# Patient Record
Sex: FEMALE | Race: WHITE | NOT HISPANIC OR LATINO | Employment: FULL TIME | URBAN - METROPOLITAN AREA
[De-identification: names, ages, dates, MRNs, and addresses within clinical notes are randomized per-mention and may not be internally consistent; named-entity substitution may affect disease eponyms.]

---

## 2023-03-21 PROBLEM — N89.8 VAGINAL ITCHING: Status: ACTIVE | Noted: 2023-03-21

## 2023-03-21 PROBLEM — G43.009 ATYPICAL MIGRAINE: Status: ACTIVE | Noted: 2023-03-21

## 2023-03-21 PROBLEM — R10.2 PELVIC PAIN IN FEMALE: Status: ACTIVE | Noted: 2023-03-21

## 2023-03-21 PROBLEM — R51.9 HEADACHE: Status: ACTIVE | Noted: 2023-03-21

## 2023-03-21 PROBLEM — B96.89 BACTERIAL VAGINOSIS: Status: ACTIVE | Noted: 2023-03-21

## 2023-03-21 PROBLEM — N20.0 NEPHROLITHIASIS: Status: ACTIVE | Noted: 2023-03-21

## 2023-03-21 PROBLEM — R07.89 ATYPICAL CHEST PAIN: Status: ACTIVE | Noted: 2023-03-21

## 2023-03-21 PROBLEM — O09.899 HIGH RISK PREGNANCY WITH LOW PAPPA (PREGNANCY-ASSOCIATED PLASMA PROTEIN A) (HHS-HCC): Status: ACTIVE | Noted: 2023-03-21

## 2023-03-21 PROBLEM — J32.9 SINUS INFECTION: Status: ACTIVE | Noted: 2023-03-21

## 2023-03-21 PROBLEM — B37.9 YEAST INFECTION: Status: ACTIVE | Noted: 2023-03-21

## 2023-03-21 PROBLEM — O28.0 HIGH RISK PREGNANCY WITH LOW PAPPA (PREGNANCY-ASSOCIATED PLASMA PROTEIN A) (HHS-HCC): Status: ACTIVE | Noted: 2023-03-21

## 2023-03-21 PROBLEM — M94.0 COSTOCHONDRITIS: Status: ACTIVE | Noted: 2023-03-21

## 2023-03-21 PROBLEM — F41.9 ANXIETY AND DEPRESSION: Status: ACTIVE | Noted: 2023-03-21

## 2023-03-21 PROBLEM — Z20.822 SUSPECTED COVID-19 VIRUS INFECTION: Status: ACTIVE | Noted: 2023-03-21

## 2023-03-21 PROBLEM — F32.A ANXIETY AND DEPRESSION: Status: ACTIVE | Noted: 2023-03-21

## 2023-03-21 PROBLEM — E83.52 HYPERCALCEMIA: Status: ACTIVE | Noted: 2023-03-21

## 2023-03-21 PROBLEM — M79.18 MYALGIA OF PELVIC FLOOR: Status: ACTIVE | Noted: 2023-03-21

## 2023-03-21 PROBLEM — L70.0 CYSTIC ACNE VULGARIS: Status: ACTIVE | Noted: 2023-03-21

## 2023-03-21 PROBLEM — R00.2 PALPITATIONS: Status: ACTIVE | Noted: 2023-03-21

## 2023-03-21 PROBLEM — K21.9 GERD (GASTROESOPHAGEAL REFLUX DISEASE): Status: ACTIVE | Noted: 2023-03-21

## 2023-03-21 PROBLEM — F31.9 BIPOLAR DISORDER (MULTI): Status: ACTIVE | Noted: 2023-03-21

## 2023-03-21 PROBLEM — R11.2 NAUSEA AND/OR VOMITING: Status: ACTIVE | Noted: 2023-03-21

## 2023-03-21 PROBLEM — A60.00 GENITAL HERPES: Status: ACTIVE | Noted: 2023-03-21

## 2023-03-21 PROBLEM — N76.0 BACTERIAL VAGINOSIS: Status: ACTIVE | Noted: 2023-03-21

## 2023-03-21 RX ORDER — CLINDAMYCIN AND BENZOYL PEROXIDE 10; 50 MG/G; MG/G
GEL TOPICAL
COMMUNITY
Start: 2022-05-16 | End: 2023-03-22

## 2023-03-21 RX ORDER — VALACYCLOVIR HYDROCHLORIDE 500 MG/1
TABLET, FILM COATED ORAL
COMMUNITY
Start: 2021-11-16 | End: 2023-03-22

## 2023-03-22 ENCOUNTER — OFFICE VISIT (OUTPATIENT)
Dept: PRIMARY CARE | Facility: CLINIC | Age: 28
End: 2023-03-22
Payer: COMMERCIAL

## 2023-03-22 ENCOUNTER — LAB (OUTPATIENT)
Dept: LAB | Facility: LAB | Age: 28
End: 2023-03-22
Payer: COMMERCIAL

## 2023-03-22 VITALS
OXYGEN SATURATION: 99 % | BODY MASS INDEX: 34.73 KG/M2 | RESPIRATION RATE: 16 BRPM | DIASTOLIC BLOOD PRESSURE: 90 MMHG | HEART RATE: 86 BPM | WEIGHT: 196 LBS | TEMPERATURE: 98.6 F | HEIGHT: 63 IN | SYSTOLIC BLOOD PRESSURE: 122 MMHG

## 2023-03-22 DIAGNOSIS — K21.9 GASTROESOPHAGEAL REFLUX DISEASE WITHOUT ESOPHAGITIS: ICD-10-CM

## 2023-03-22 DIAGNOSIS — R12 HEARTBURN: ICD-10-CM

## 2023-03-22 DIAGNOSIS — K21.9 GASTROESOPHAGEAL REFLUX DISEASE WITHOUT ESOPHAGITIS: Primary | ICD-10-CM

## 2023-03-22 DIAGNOSIS — R63.5 WEIGHT GAIN: ICD-10-CM

## 2023-03-22 LAB
ALANINE AMINOTRANSFERASE (SGPT) (U/L) IN SER/PLAS: 13 U/L (ref 7–45)
ALBUMIN (G/DL) IN SER/PLAS: 4.7 G/DL (ref 3.4–5)
ALKALINE PHOSPHATASE (U/L) IN SER/PLAS: 72 U/L (ref 33–110)
ANION GAP IN SER/PLAS: 12 MMOL/L (ref 10–20)
ASPARTATE AMINOTRANSFERASE (SGOT) (U/L) IN SER/PLAS: 13 U/L (ref 9–39)
BASOPHILS (10*3/UL) IN BLOOD BY AUTOMATED COUNT: 0.05 X10E9/L (ref 0–0.1)
BASOPHILS/100 LEUKOCYTES IN BLOOD BY AUTOMATED COUNT: 0.6 % (ref 0–2)
BILIRUBIN TOTAL (MG/DL) IN SER/PLAS: 0.5 MG/DL (ref 0–1.2)
CALCIUM (MG/DL) IN SER/PLAS: 9.6 MG/DL (ref 8.6–10.3)
CARBON DIOXIDE, TOTAL (MMOL/L) IN SER/PLAS: 26 MMOL/L (ref 21–32)
CHLORIDE (MMOL/L) IN SER/PLAS: 103 MMOL/L (ref 98–107)
CREATININE (MG/DL) IN SER/PLAS: 0.85 MG/DL (ref 0.5–1.05)
EOSINOPHILS (10*3/UL) IN BLOOD BY AUTOMATED COUNT: 0.13 X10E9/L (ref 0–0.7)
EOSINOPHILS/100 LEUKOCYTES IN BLOOD BY AUTOMATED COUNT: 1.4 % (ref 0–6)
ERYTHROCYTE DISTRIBUTION WIDTH (RATIO) BY AUTOMATED COUNT: 11.9 % (ref 11.5–14.5)
ERYTHROCYTE MEAN CORPUSCULAR HEMOGLOBIN CONCENTRATION (G/DL) BY AUTOMATED: 32.5 G/DL (ref 32–36)
ERYTHROCYTE MEAN CORPUSCULAR VOLUME (FL) BY AUTOMATED COUNT: 90 FL (ref 80–100)
ERYTHROCYTES (10*6/UL) IN BLOOD BY AUTOMATED COUNT: 4.71 X10E12/L (ref 4–5.2)
GFR FEMALE: >90 ML/MIN/1.73M2
GLUCOSE (MG/DL) IN SER/PLAS: 83 MG/DL (ref 74–99)
HEMATOCRIT (%) IN BLOOD BY AUTOMATED COUNT: 42.5 % (ref 36–46)
HEMOGLOBIN (G/DL) IN BLOOD: 13.8 G/DL (ref 12–16)
IMMATURE GRANULOCYTES/100 LEUKOCYTES IN BLOOD BY AUTOMATED COUNT: 0.2 % (ref 0–0.9)
LEUKOCYTES (10*3/UL) IN BLOOD BY AUTOMATED COUNT: 9 X10E9/L (ref 4.4–11.3)
LYMPHOCYTES (10*3/UL) IN BLOOD BY AUTOMATED COUNT: 2.46 X10E9/L (ref 1.2–4.8)
LYMPHOCYTES/100 LEUKOCYTES IN BLOOD BY AUTOMATED COUNT: 27.4 % (ref 13–44)
MONOCYTES (10*3/UL) IN BLOOD BY AUTOMATED COUNT: 0.58 X10E9/L (ref 0.1–1)
MONOCYTES/100 LEUKOCYTES IN BLOOD BY AUTOMATED COUNT: 6.5 % (ref 2–10)
NEUTROPHILS (10*3/UL) IN BLOOD BY AUTOMATED COUNT: 5.74 X10E9/L (ref 1.2–7.7)
NEUTROPHILS/100 LEUKOCYTES IN BLOOD BY AUTOMATED COUNT: 63.9 % (ref 40–80)
NRBC (PER 100 WBCS) BY AUTOMATED COUNT: 0 /100 WBC (ref 0–0)
PLATELETS (10*3/UL) IN BLOOD AUTOMATED COUNT: 286 X10E9/L (ref 150–450)
POTASSIUM (MMOL/L) IN SER/PLAS: 4.4 MMOL/L (ref 3.5–5.3)
PROTEIN TOTAL: 7.6 G/DL (ref 6.4–8.2)
SODIUM (MMOL/L) IN SER/PLAS: 137 MMOL/L (ref 136–145)
THYROTROPIN (MIU/L) IN SER/PLAS BY DETECTION LIMIT <= 0.05 MIU/L: 1.27 MIU/L (ref 0.44–3.98)
UREA NITROGEN (MG/DL) IN SER/PLAS: 15 MG/DL (ref 6–23)

## 2023-03-22 PROCEDURE — 99214 OFFICE O/P EST MOD 30 MIN: CPT | Performed by: FAMILY MEDICINE

## 2023-03-22 PROCEDURE — 80053 COMPREHEN METABOLIC PANEL: CPT

## 2023-03-22 PROCEDURE — 85025 COMPLETE CBC W/AUTO DIFF WBC: CPT

## 2023-03-22 PROCEDURE — 84443 ASSAY THYROID STIM HORMONE: CPT

## 2023-03-22 PROCEDURE — 36415 COLL VENOUS BLD VENIPUNCTURE: CPT

## 2023-03-22 RX ORDER — PANTOPRAZOLE SODIUM 40 MG/1
40 TABLET, DELAYED RELEASE ORAL DAILY
Qty: 30 TABLET | Refills: 2 | Status: SHIPPED | OUTPATIENT
Start: 2023-03-22 | End: 2024-01-29 | Stop reason: SDUPTHER

## 2023-03-22 ASSESSMENT — ENCOUNTER SYMPTOMS
NAUSEA: 1
HEARTBURN: 1

## 2023-03-22 NOTE — PATIENT INSTRUCTIONS
Today we addressed your heartburn and reflux symptoms  Start PPI and check labs  Encourage better nutrition and exercise habits      Follow up in 3 months to recheck.   If no better we will consult GI.

## 2023-03-22 NOTE — PROGRESS NOTES
"Subjective   Patient ID: Lynette Landers is a 28 y.o. female who presents for Nausea and Heartburn.    Stomach bug at home 2 months ago and then thought she was pregnant and had negative pregnancy tests.  She has been experiencing nausea mostly in the evening.  She doesn't throw up. She has tried tums.  The heartburn is happening daily.  Food choice doesn't matter it happens with everything.  When she was pregnant she took something for heartburn.    She denies any chanes in bowels, no diarrhea/constipation or black tarry stool and no blood in the stool.  In December she took a pay cut and has been eating more processed foods and less fresh fruits and veggies.      Heartburn  She complains of heartburn and nausea. This is a chronic problem. The current episode started more than 1 month ago. The problem occurs constantly. The heartburn duration is several minutes. The heartburn is located in the substernum. The heartburn is of moderate intensity. The symptoms are aggravated by certain foods and stress. Risk factors include obesity, lack of exercise, NSAIDs and caffeine use. She has tried an antacid for the symptoms. The treatment provided mild relief.        Review of Systems   Gastrointestinal:  Positive for heartburn and nausea.       Objective   /90   Pulse 86   Temp 37 °C (98.6 °F)   Resp 16   Ht 1.6 m (5' 3\")   Wt 88.9 kg (196 lb)   LMP 02/25/2023   SpO2 99%   BMI 34.72 kg/m²     Physical Exam  Constitutional:       Appearance: Normal appearance.   HENT:      Head: Normocephalic and atraumatic.      Mouth/Throat:      Mouth: Mucous membranes are moist.   Eyes:      Pupils: Pupils are equal, round, and reactive to light.   Cardiovascular:      Rate and Rhythm: Normal rate and regular rhythm.   Pulmonary:      Effort: Pulmonary effort is normal.      Breath sounds: Normal breath sounds.   Abdominal:      General: Abdomen is flat. Bowel sounds are normal.      Tenderness: There is abdominal tenderness " (epigastric to RUQ region). There is no guarding.   Musculoskeletal:      Cervical back: Normal range of motion and neck supple.   Neurological:      Mental Status: She is alert.   Psychiatric:         Mood and Affect: Mood normal.         Assessment/Plan   Diagnoses and all orders for this visit:  Gastroesophageal reflux disease without esophagitis  -     pantoprazole (ProtoNix) 40 mg EC tablet; Take 1 tablet (40 mg) by mouth once daily. Do not crush, chew, or split.  -     CBC and Auto Differential; Future  -     Comprehensive Metabolic Panel; Future  -     TSH with reflex to Free T4 if abnormal; Future  Heartburn  -     pantoprazole (ProtoNix) 40 mg EC tablet; Take 1 tablet (40 mg) by mouth once daily. Do not crush, chew, or split.  Weight gain

## 2023-05-01 ENCOUNTER — PATIENT MESSAGE (OUTPATIENT)
Dept: PRIMARY CARE | Facility: CLINIC | Age: 28
End: 2023-05-01
Payer: COMMERCIAL

## 2023-05-01 DIAGNOSIS — K21.9 GASTROESOPHAGEAL REFLUX DISEASE WITHOUT ESOPHAGITIS: Primary | ICD-10-CM

## 2023-06-26 ENCOUNTER — APPOINTMENT (OUTPATIENT)
Dept: PRIMARY CARE | Facility: CLINIC | Age: 28
End: 2023-06-26
Payer: COMMERCIAL

## 2023-08-02 LAB — URINE CULTURE: ABNORMAL

## 2024-01-16 ENCOUNTER — TELEMEDICINE (OUTPATIENT)
Dept: DERMATOLOGY | Facility: CLINIC | Age: 29
End: 2024-01-16
Payer: COMMERCIAL

## 2024-01-16 DIAGNOSIS — L70.0 ACNE VULGARIS: Primary | ICD-10-CM

## 2024-01-16 PROCEDURE — 99214 OFFICE O/P EST MOD 30 MIN: CPT | Performed by: NURSE PRACTITIONER

## 2024-01-16 RX ORDER — TRETINOIN 0.5 MG/G
CREAM TOPICAL NIGHTLY
Qty: 20 G | Refills: 1 | Status: SHIPPED | OUTPATIENT
Start: 2024-01-16 | End: 2025-01-15

## 2024-01-16 RX ORDER — BENZOYL PEROXIDE 100 MG/ML
1 LIQUID TOPICAL DAILY
Qty: 237 G | Refills: 11 | Status: SHIPPED | OUTPATIENT
Start: 2024-01-16 | End: 2025-01-15

## 2024-01-16 RX ORDER — CLINDAMYCIN PHOSPHATE 10 UG/ML
LOTION TOPICAL 2 TIMES DAILY
Qty: 60 ML | Refills: 1 | Status: SHIPPED | OUTPATIENT
Start: 2024-01-16

## 2024-01-16 NOTE — PROGRESS NOTES
Subjective     Jennifer Landers is a 28 y.o. female who presents for the following: Acne.     Established patient in for follow up for acne to face.   Has tried BPO wash. Clindamycin lotion. Clindamycin/BPO gel, sprionolactone 50mg, tretinion 0.025%    Review of Systems:  No other skin or systemic complaints other than what is documented elsewhere in the note.    The following portions of the chart were reviewed this encounter and updated as appropriate:       Skin Cancer History  No skin cancer on file.    Specialty Problems          Dermatology Problems    Cystic acne vulgaris     Past Medical History:  Jennifer Landers  has a past medical history of Acute vaginitis (06/08/2020), Encounter for supervision of normal pregnancy, unspecified, unspecified trimester (08/09/2019), Encounter for supervision of normal pregnancy, unspecified, unspecified trimester (10/22/2019), Other complications of the puerperium, not elsewhere classified (06/16/2020), Other conditions influencing health status (07/23/2019), Other viral diseases complicating pregnancy, unspecified trimester (08/09/2019), Personal history of diseases of the blood and blood-forming organs and certain disorders involving the immune mechanism (11/11/2019), Personal history of diseases of the skin and subcutaneous tissue (08/09/2019), Personal history of other diseases of the female genital tract (07/23/2019), Personal history of other infectious and parasitic diseases (05/15/2020), Personal history of other specified conditions (05/04/2020), Personal history of other specified conditions (11/18/2019), Personal history of other specified conditions (12/03/2019), Sacrococcygeal disorders, not elsewhere classified (11/12/2020), Secondary amenorrhea (11/11/2019), Unspecified infection of urinary tract in pregnancy, unspecified trimester (10/05/2020), and Unspecified tubal pregnancy without intrauterine pregnancy (10/05/2020).    Past Surgical History:  Jennifer Landers   has a past surgical history that includes Other surgical history (02/09/2021).    Family History:  Patient family history includes Chiari malformation in her mother; Hypothyroidism in her mother.    Social History:  Jennifer Landers  reports that she has never smoked. She has never used smokeless tobacco. No history on file for alcohol use and drug use.    Allergies:  Aripiprazole    Current Medications / CAM's:    Current Outpatient Medications:     benzoyl peroxide (Benzac AC) 10 % external wash, Apply 1 Application topically once daily., Disp: 237 g, Rfl: 11    clindamycin (Cleocin T) 1 % lotion, Apply topically 2 times a day., Disp: 60 mL, Rfl: 1    pantoprazole (ProtoNix) 40 mg EC tablet, Take 1 tablet (40 mg) by mouth once daily. Do not crush, chew, or split., Disp: 30 tablet, Rfl: 2    tretinoin (Retin-A) 0.05 % cream, Apply topically once daily at bedtime. Apply a pea size amount nightly to entire face. Moisturize as needed, Disp: 20 g, Rfl: 1     Objective   Well appearing patient in no apparent distress; mood and affect are within normal limits.    A full examination was performed including scalp, head, eyes, ears, nose, lips, neck, chest, axillae, abdomen, back, buttocks, bilateral upper extremities, bilateral lower extremities, hands, feet, fingers, toes, fingernails, and toenails. All findings within normal limits unless otherwise noted below.    Assessment/Plan   1. Acne vulgaris  Head - Anterior (Face)  Scattered comedones and inflammatory papulopustules.  Worse at menstruation.     Maintenance of Current Regimen: Please continue using the prescribed topical medications as directed. Consistency is key to maintaining the improvement achieved so far.    PLAN:  Start tretinoin 0.05% nightly  Start clindamycin 1% lotion daily  Start BPO 10% wash daily  Patient is still breastfeeding, so can consider oral treatments in the future      Further Treatment Options: If there are no significant improvements or if  your acne worsens, we may consider additional treatment options such as oral medications or in-office procedures. However, it is important to note that most cases of mild acne can be managed effectively with the current regimen.    Skin Care Tips: Continue following a gentle cleansing routine, avoiding harsh scrubbing, and using non-comedogenic moisturizers to keep your skin hydrated without clogging pores.    Cleansing Routine: Gentle Cleanser: You have been using a mild, non-comedogenic cleanser to wash your face twice daily. This practice helps to remove excess oil, dirt, and impurities from your skin, minimizing the risk of pore blockage.    Lifestyle Measures: Avoiding Trigger Factors: We also discussed avoiding known triggers such as excessive sun exposure, touching or picking at the acne lesions, and using heavy or comedogenic cosmetic products.    Please feel free to contact our clinic if you have any questions or concerns between appointments. Remember, acne treatment requires patience and consistency.                 tretinoin (Retin-A) 0.05 % cream - Head - Anterior (Face)  Apply topically once daily at bedtime. Apply a pea size amount nightly to entire face. Moisturize as needed    clindamycin (Cleocin T) 1 % lotion - Head - Anterior (Face)  Apply topically 2 times a day.    benzoyl peroxide (Benzac AC) 10 % external wash - Head - Anterior (Face)  Apply 1 Application topically once daily.

## 2024-01-22 ENCOUNTER — DOCUMENTATION (OUTPATIENT)
Dept: DERMATOLOGY | Facility: CLINIC | Age: 29
End: 2024-01-22
Payer: COMMERCIAL

## 2024-01-22 NOTE — PROGRESS NOTES
PA for Tretinoin 0.05% cream has been approved. Approval to be scanned in chart. Pharmacy and pt to be notified.

## 2024-01-29 ENCOUNTER — OFFICE VISIT (OUTPATIENT)
Dept: PRIMARY CARE | Facility: CLINIC | Age: 29
End: 2024-01-29
Payer: COMMERCIAL

## 2024-01-29 VITALS
TEMPERATURE: 98.3 F | SYSTOLIC BLOOD PRESSURE: 114 MMHG | DIASTOLIC BLOOD PRESSURE: 82 MMHG | OXYGEN SATURATION: 99 % | HEART RATE: 93 BPM | WEIGHT: 194 LBS | RESPIRATION RATE: 16 BRPM | BODY MASS INDEX: 34.37 KG/M2

## 2024-01-29 DIAGNOSIS — K21.9 GASTROESOPHAGEAL REFLUX DISEASE WITHOUT ESOPHAGITIS: ICD-10-CM

## 2024-01-29 DIAGNOSIS — R12 HEARTBURN: ICD-10-CM

## 2024-01-29 PROCEDURE — 99214 OFFICE O/P EST MOD 30 MIN: CPT | Performed by: FAMILY MEDICINE

## 2024-01-29 RX ORDER — OMEPRAZOLE 10 MG/1
10 CAPSULE, DELAYED RELEASE ORAL
COMMUNITY
End: 2024-01-29 | Stop reason: WASHOUT

## 2024-01-29 RX ORDER — PANTOPRAZOLE SODIUM 40 MG/1
40 TABLET, DELAYED RELEASE ORAL DAILY
Qty: 30 TABLET | Refills: 2 | Status: SHIPPED | OUTPATIENT
Start: 2024-01-29 | End: 2024-04-29

## 2024-01-29 ASSESSMENT — ENCOUNTER SYMPTOMS: ABDOMINAL PAIN: 1

## 2024-01-29 NOTE — PATIENT INSTRUCTIONS
Today we addressed your recurrent abdomianl pain/GERD symptoms  You have had slight improvement on otc omeprazole  I will switch you back to Rx pantoprazole and I will have you follow up with GI since this is your second episode this year.   If anything worsens please let me know ASAP    Please make an appointment to follow up for your Annual Physical.

## 2024-01-29 NOTE — PROGRESS NOTES
Subjective   Patient ID: Jennifer Landers is a 29 y.o. female who presents for Abdominal Pain (Upper left quadrant - nausea. No vomiting, diarrhea or constipation).    She woke up with a tearing pain in her stomach and ever since then it's been hurting. It's in the upper left side.It comes and goes.  Eating makes it worse.  Th pain sometimes travels through to the back.  No blood in the stool or black stool.  She is nauseous.  No vomiting.  She denies any dietary changes and hasn't eaten anything different.  She is taking otc omeprazole but it hasn't helped completely.  Her BM's are normal for the most part.  She was treated in March and did well on pantoprazole for 3 months.  She then stopped it.  Now the symptoms are back.      Abdominal Pain         Review of Systems   Gastrointestinal:  Positive for abdominal pain.       Objective   /82   Pulse 93   Temp 36.8 °C (98.3 °F)   Resp 16   Wt 88 kg (194 lb)   LMP 01/22/2024   SpO2 99%   BMI 34.37 kg/m²     Physical Exam  Constitutional:       Appearance: Normal appearance.   HENT:      Head: Normocephalic and atraumatic.      Mouth/Throat:      Mouth: Mucous membranes are moist.   Eyes:      Pupils: Pupils are equal, round, and reactive to light.   Cardiovascular:      Rate and Rhythm: Normal rate and regular rhythm.      Pulses: Normal pulses.   Pulmonary:      Effort: Pulmonary effort is normal.      Breath sounds: Normal breath sounds.   Abdominal:      General: Abdomen is flat. Bowel sounds are normal. There is no distension.      Palpations: There is no mass.      Tenderness: There is no abdominal tenderness. There is no guarding.   Musculoskeletal:      Cervical back: Normal range of motion and neck supple.   Neurological:      Mental Status: She is alert.   Psychiatric:         Mood and Affect: Mood normal.         Assessment/Plan   Diagnoses and all orders for this visit:  Gastroesophageal reflux disease without esophagitis  -     pantoprazole  (ProtoNix) 40 mg EC tablet; Take 1 tablet (40 mg) by mouth once daily. Do not crush, chew, or split.  -     Referral to Gastroenterology; Future  Heartburn  -     pantoprazole (ProtoNix) 40 mg EC tablet; Take 1 tablet (40 mg) by mouth once daily. Do not crush, chew, or split.  -     Referral to Gastroenterology; Future

## 2024-02-19 ENCOUNTER — OFFICE VISIT (OUTPATIENT)
Dept: GASTROENTEROLOGY | Facility: CLINIC | Age: 29
End: 2024-02-19
Payer: COMMERCIAL

## 2024-02-19 VITALS
HEART RATE: 90 BPM | HEIGHT: 63 IN | BODY MASS INDEX: 34.48 KG/M2 | DIASTOLIC BLOOD PRESSURE: 84 MMHG | SYSTOLIC BLOOD PRESSURE: 121 MMHG | OXYGEN SATURATION: 98 % | WEIGHT: 194.6 LBS

## 2024-02-19 DIAGNOSIS — R12 HEARTBURN: ICD-10-CM

## 2024-02-19 DIAGNOSIS — R10.13 EPIGASTRIC ABDOMINAL PAIN: Primary | ICD-10-CM

## 2024-02-19 DIAGNOSIS — K21.9 GASTROESOPHAGEAL REFLUX DISEASE WITHOUT ESOPHAGITIS: ICD-10-CM

## 2024-02-19 PROCEDURE — 1036F TOBACCO NON-USER: CPT | Performed by: NURSE PRACTITIONER

## 2024-02-19 PROCEDURE — 99213 OFFICE O/P EST LOW 20 MIN: CPT | Performed by: NURSE PRACTITIONER

## 2024-02-19 NOTE — PROGRESS NOTES
"Assessment/Plan   Jennifer Landers is a 29 y.o. female who presents to GI clinic for LUQ abdominal pain with nausea worse with food. Reports pain in LUQ. Pain \"comes and goes\"and eating makes it worse. Pain radiates to the back at times. She denies weight loss, diarrhea, black or bloody stools. Pt was taking OTC omeprazole with no relief of symptoms. PCP started her on Pantoprazole 40 mg PO once daily and patient reports improvement in symptoms. Patient does report having heart burn that started during her pregnancy 2.5 years ago. She took famotidine during pregnancy and then was able to wean off of medication after the baby was born. She reports having heartburn now when eating red sauces. No routine NSAID use. No family history of GI malignancies.     GERD vs gastritis  2.   Consider gallstones, cholecystitis    Continue Pantoprazole 40 mg PO once daily  Antireflux lifestyle  Avoid NSAIDs  Check RUQ US - results will be called to patient once available.  Follow up with GI in 4 weeks.      Antireflux lifestyle modifications   Avoid alcoholic beverages, Caffeine, carbonated beverages, smoking, trigger foods. Trigger foods include citrus fruits, chocolate, fatty and fried foods, garlic and onions, mint flavorings, spicy foods and tomato-based foods like spaghetti sauce, salsa, chili or pizza.  Avoid aspirin and NSAIDs if possible  Eat small frequent meals. Eat at least 2 hours prior to going to bed.   Try raising the head of the bed to prevent stomach acid from coming back up.   Recommend stress reduction, weight reduction, and physical exercise can help reduce symptoms of GERD.   Call or return to office if GERD symptoms become worse or if you have difficulty swallowing, pain with swallowing, are vomiting blood, notice black or bloody stools, or have unexplained weight loss.    Yanely Mills, APRN-CNP      Subjective     History of Present Illness:   Jennifer Landers is a 29 y.o. female who presents to GI clinic for " "GERD. Patient was seen by her PCP for tearing abdominal pain with nausea, no vomiting. Reports pain in LUQ. Pain \"comes and goes\"and eating makes it worse. Pain radiates to the back at times. She denies weight loss, diarrhea, black or bloody stools. Pt was taking OTC omeprazole with no relief of symptoms. PCP started her on Pantoprazole 40 mg PO once daily and patient reports improvement in symptoms. Patient does report having heart burn that started during her pregnancy 2.5 years ago. She took famotidine during pregnancy and then was able to wean off of medication after the baby was born. She reports having heartburn now when eating red sauces. No routine NSAID use. No family history of GI malignancies.     Review of Systems  ROS Negative unless otherwise stated above.    Past Medical History   has a past medical history of Acute vaginitis (06/08/2020), Encounter for supervision of normal pregnancy, unspecified, unspecified trimester (08/09/2019), Encounter for supervision of normal pregnancy, unspecified, unspecified trimester (10/22/2019), Other complications of the puerperium, not elsewhere classified (06/16/2020), Other conditions influencing health status (07/23/2019), Other viral diseases complicating pregnancy, unspecified trimester (08/09/2019), Personal history of diseases of the blood and blood-forming organs and certain disorders involving the immune mechanism (11/11/2019), Personal history of diseases of the skin and subcutaneous tissue (08/09/2019), Personal history of other diseases of the female genital tract (07/23/2019), Personal history of other infectious and parasitic diseases (05/15/2020), Personal history of other specified conditions (05/04/2020), Personal history of other specified conditions (11/18/2019), Personal history of other specified conditions (12/03/2019), Sacrococcygeal disorders, not elsewhere classified (11/12/2020), Secondary amenorrhea (11/11/2019), Unspecified infection of " urinary tract in pregnancy, unspecified trimester (10/05/2020), and Unspecified tubal pregnancy without intrauterine pregnancy (10/05/2020).     Social History   reports that she has never smoked. She has never used smokeless tobacco.     Family History  family history includes Chiari malformation in her mother; Hypothyroidism in her mother.     Allergies  Allergies   Allergen Reactions    Aripiprazole Swelling       Medications  Current Outpatient Medications   Medication Instructions    benzoyl peroxide (Benzac AC) 10 % external wash 1 Application, Topical, Daily    clindamycin (Cleocin T) 1 % lotion Topical, 2 times daily    pantoprazole (PROTONIX) 40 mg, oral, Daily, Do not crush, chew, or split.     tretinoin (Retin-A) 0.05 % cream Topical, Nightly, Apply a pea size amount nightly to entire face. Moisturize as needed        Objective   Visit Vitals  /84   Pulse 90      General: A&Ox3, NAD.  HEENT: AT/NC.   CV: RRR. No murmur.  Resp: CTA bilaterally. No wheezing, rhonchi or rales.   GI: Soft, NT/ND. BSx4.  Extrem: No edema. Pulses intact.  Skin: No Jaundice.   Neuro: No focal deficits.   Psych: Normal mood and affect.     Lab Results   Component Value Date    WBC 9.0 03/22/2023    WBC 7.5 06/02/2022    WBC 9.9 11/17/2021    HGB 13.8 03/22/2023    HGB 13.4 06/02/2022    HGB 14.3 11/17/2021    HCT 42.5 03/22/2023    HCT 41.5 06/02/2022    HCT 43.7 11/17/2021     03/22/2023     06/02/2022     11/17/2021     Lab Results   Component Value Date     03/22/2023    K 4.4 03/22/2023     03/22/2023    CO2 26 03/22/2023    BUN 15 03/22/2023    CREATININE 0.85 03/22/2023    GLUCOSE 83 03/22/2023    CALCIUM 9.6 03/22/2023       Lab Results   Component Value Date    ALKPHOS 72 03/22/2023    ALKPHOS 70 06/02/2022    ALKPHOS 82 11/17/2021    BILITOT 0.5 03/22/2023    BILITOT 0.3 06/02/2022    BILITOT 0.3 11/17/2021    PROT 7.6 03/22/2023    PROT 7.6 06/02/2022    PROT 8.3 (H) 11/17/2021     ALT 13 03/22/2023    ALT 11 06/02/2022    ALT 15 11/17/2021    AST 13 03/22/2023    AST 12 06/02/2022    AST 16 11/17/2021      Lab Results   Component Value Date    INR 1.0 09/22/2020

## 2024-03-02 ENCOUNTER — HOSPITAL ENCOUNTER (OUTPATIENT)
Dept: RADIOLOGY | Facility: HOSPITAL | Age: 29
Discharge: HOME | End: 2024-03-02
Payer: COMMERCIAL

## 2024-03-02 DIAGNOSIS — R10.13 EPIGASTRIC ABDOMINAL PAIN: ICD-10-CM

## 2024-03-02 PROCEDURE — 76700 US EXAM ABDOM COMPLETE: CPT | Performed by: RADIOLOGY

## 2024-03-02 PROCEDURE — 76700 US EXAM ABDOM COMPLETE: CPT

## 2024-03-04 DIAGNOSIS — K82.8 THICKENING OF WALL OF GALLBLADDER: Primary | ICD-10-CM

## 2024-03-04 DIAGNOSIS — K80.20 GALLSTONES: ICD-10-CM

## 2024-03-04 DIAGNOSIS — K80.80 BILIARY CALCULUS OF OTHER SITE WITHOUT OBSTRUCTION: Primary | ICD-10-CM

## 2024-03-04 NOTE — RESULT ENCOUNTER NOTE
Ultrasound showed gallstones with gallbladder wall thickening.  Next, check gallbladder function with HIDA scan.  Order placed in computer

## 2024-03-05 ENCOUNTER — PATIENT MESSAGE (OUTPATIENT)
Dept: GASTROENTEROLOGY | Facility: CLINIC | Age: 29
End: 2024-03-05
Payer: COMMERCIAL

## 2024-03-20 ENCOUNTER — OFFICE VISIT (OUTPATIENT)
Dept: GASTROENTEROLOGY | Facility: CLINIC | Age: 29
End: 2024-03-20
Payer: COMMERCIAL

## 2024-03-20 DIAGNOSIS — K21.9 GASTROESOPHAGEAL REFLUX DISEASE WITHOUT ESOPHAGITIS: ICD-10-CM

## 2024-03-20 DIAGNOSIS — R10.13 POSTPRANDIAL EPIGASTRIC PAIN: Primary | ICD-10-CM

## 2024-03-20 PROCEDURE — 99214 OFFICE O/P EST MOD 30 MIN: CPT | Performed by: NURSE PRACTITIONER

## 2024-03-20 PROCEDURE — 1036F TOBACCO NON-USER: CPT | Performed by: NURSE PRACTITIONER

## 2024-03-20 NOTE — PROGRESS NOTES
Assessment/Plan   Jennifer Landers is a 29 y.o. female who being evaluated today through virtual visit for postprandial pain with nausea but no vomiting.  Reported a tearing abdominal pain that would sometimes radiate to her back associated with nausea but no vomiting that started approximately 1 hour after eating.  Resolved within 2 hours.  Patient also had reports of heartburn or reflux and indigestion and was started on pantoprazole and symptoms seem to be improving.  Right upper quadrant ultrasound was ordered and showed cholelithiasis with gallbladder wall thickening.  HIDA Scan has been ordered and is scheduled for Friday of this week.    Cholelithiasis with gallbladder wall thickening  GERD/gastritis  Postprandial pain    Check CBC, CMP, lipase and lactate  HIDA scan scheduled for Friday, 03/22/2024  Refer patient to general surgery - Dr. Sher  Continue pantoprazole 40 mg p.o. once daily  Avoid NSAIDs  Antireflux lifestyle discussed    Antireflux lifestyle modifications   Avoid alcoholic beverages, Caffeine, carbonated beverages, smoking, trigger foods. Trigger foods include citrus fruits, chocolate, fatty and fried foods, garlic and onions, mint flavorings, spicy foods and tomato-based foods like spaghetti sauce, salsa, chili or pizza.  Avoid aspirin and NSAIDs if possible  Eat small frequent meals. Eat at least 2 hours prior to going to bed.   Try raising the head of the bed to prevent stomach acid from coming back up.   Recommend stress reduction, weight reduction, and physical exercise can help reduce symptoms of GERD.   Call or return to office if GERD symptoms become worse or if you have difficulty swallowing, pain with swallowing, are vomiting blood, notice black or bloody stools, or have unexplained weight loss.    UDAY Wheatley-CNP      Subjective     History of Present Illness:   Jennifer Landers is a 29 y.o. female who is being evaluated through a virtual visit today for follow-up on gastritis  "versus GERD versus gallbladder disease.  Patient had complained of a \"tearing abdominal pain\" with nausea without vomiting associated with food.  Patient was started on pantoprazole 40 mg p.o. once daily had some improvement in symptoms.  Patient had mentioned that symptoms started during her pregnancy and have progressively gotten worse over the last 2 years.  Also reports pain started about an hour after meals so I also ordered a right upper quadrant ultrasound to evaluate the gallbladder for stones or inflammation/infection.  RUQ US did show cholelithiasis with gallbladder wall thickening. Patient denies weight loss, dysphagia, odynophagia, sore throat, heartburn or indigestion, regurgitation, hematemesis, coffee ground emesis, melena, or bloody stool.    LOV 02/19/24  Jennifer Landers is a 29 y.o. female who presents to GI clinic for GERD. Patient was seen by her PCP for tearing abdominal pain with nausea, no vomiting. Reports pain in LUQ. Pain \"comes and goes\"and eating makes it worse. Pain radiates to the back at times. She denies weight loss, diarrhea, black or bloody stools. Pt was taking OTC omeprazole with no relief of symptoms. PCP started her on Pantoprazole 40 mg PO once daily and patient reports improvement in symptoms. Patient does report having heart burn that started during her pregnancy 2.5 years ago. She took famotidine during pregnancy and then was able to wean off of medication after the baby was born. She reports having heartburn now when eating red sauces. No routine NSAID use. No family history of GI malignancies.     Jennifer Landers is a 29 y.o. female who presents to GI clinic for LUQ abdominal pain with nausea worse with food. Reports pain in LUQ. Pain \"comes and goes\"and eating makes it worse. Pain radiates to the back at times. She denies weight loss, diarrhea, black or bloody stools. Pt was taking OTC omeprazole with no relief of symptoms. PCP started her on Pantoprazole 40 mg PO once daily " and patient reports improvement in symptoms. Patient does report having heart burn that started during her pregnancy 2.5 years ago. She took famotidine during pregnancy and then was able to wean off of medication after the baby was born. She reports having heartburn now when eating red sauces. No routine NSAID use. No family history of GI malignancies.        Review of Systems  ROS Negative unless otherwise stated above.    Past Medical History   has a past medical history of Acute vaginitis (06/08/2020), Encounter for supervision of normal pregnancy, unspecified, unspecified trimester (08/09/2019), Encounter for supervision of normal pregnancy, unspecified, unspecified trimester (10/22/2019), Other complications of the puerperium, not elsewhere classified (06/16/2020), Other conditions influencing health status (07/23/2019), Other viral diseases complicating pregnancy, unspecified trimester (08/09/2019), Personal history of diseases of the blood and blood-forming organs and certain disorders involving the immune mechanism (11/11/2019), Personal history of diseases of the skin and subcutaneous tissue (08/09/2019), Personal history of other diseases of the female genital tract (07/23/2019), Personal history of other infectious and parasitic diseases (05/15/2020), Personal history of other specified conditions (05/04/2020), Personal history of other specified conditions (11/18/2019), Personal history of other specified conditions (12/03/2019), Sacrococcygeal disorders, not elsewhere classified (11/12/2020), Secondary amenorrhea (11/11/2019), Unspecified infection of urinary tract in pregnancy, unspecified trimester (10/05/2020), and Unspecified tubal pregnancy without intrauterine pregnancy (10/05/2020).     Social History   reports that she has never smoked. She has never used smokeless tobacco.     Family History  family history includes Chiari malformation in her mother; Hypothyroidism in her mother.      Allergies  Allergies   Allergen Reactions    Aripiprazole Swelling       Medications  Current Outpatient Medications   Medication Instructions    benzoyl peroxide (Benzac AC) 10 % external wash 1 Application, Topical, Daily    clindamycin (Cleocin T) 1 % lotion Topical, 2 times daily    pantoprazole (PROTONIX) 40 mg, oral, Daily, Do not crush, chew, or split.     tretinoin (Retin-A) 0.05 % cream Topical, Nightly, Apply a pea size amount nightly to entire face. Moisturize as needed        Objective   VV    General: A&Ox3, NAD.  HEENT: AT/NC.   Skin: No Jaundice.   Neuro: No focal deficits.   Psych: Normal mood and affect.     Lab Results   Component Value Date    WBC 9.0 03/22/2023    WBC 7.5 06/02/2022    WBC 9.9 11/17/2021    HGB 13.8 03/22/2023    HGB 13.4 06/02/2022    HGB 14.3 11/17/2021    HCT 42.5 03/22/2023    HCT 41.5 06/02/2022    HCT 43.7 11/17/2021     03/22/2023     06/02/2022     11/17/2021     Lab Results   Component Value Date     03/22/2023    K 4.4 03/22/2023     03/22/2023    CO2 26 03/22/2023    BUN 15 03/22/2023    CREATININE 0.85 03/22/2023    GLUCOSE 83 03/22/2023    CALCIUM 9.6 03/22/2023       Lab Results   Component Value Date    ALKPHOS 72 03/22/2023    ALKPHOS 70 06/02/2022    ALKPHOS 82 11/17/2021    BILITOT 0.5 03/22/2023    BILITOT 0.3 06/02/2022    BILITOT 0.3 11/17/2021    PROT 7.6 03/22/2023    PROT 7.6 06/02/2022    PROT 8.3 (H) 11/17/2021    ALT 13 03/22/2023    ALT 11 06/02/2022    ALT 15 11/17/2021    AST 13 03/22/2023    AST 12 06/02/2022    AST 16 11/17/2021      Lab Results   Component Value Date    INR 1.0 09/22/2020     === 03/02/24 ===    US ABDOMEN COMPLETE    - Impression -  Cholelithiasis with gallbladder wall thickening.    MACRO:  None    Signed by: Keny Oscar 3/4/2024 4:20 PM  Dictation workstation:   [id number]

## 2024-03-22 ENCOUNTER — HOSPITAL ENCOUNTER (OUTPATIENT)
Dept: RADIOLOGY | Facility: HOSPITAL | Age: 29
Discharge: HOME | End: 2024-03-22
Payer: COMMERCIAL

## 2024-03-22 DIAGNOSIS — K82.8 THICKENING OF WALL OF GALLBLADDER: ICD-10-CM

## 2024-03-22 DIAGNOSIS — K80.20 GALLSTONES: ICD-10-CM

## 2024-03-22 PROCEDURE — A9537 TC99M MEBROFENIN: HCPCS | Performed by: FAMILY MEDICINE

## 2024-03-22 PROCEDURE — 2500000004 HC RX 250 GENERAL PHARMACY W/ HCPCS (ALT 636 FOR OP/ED)

## 2024-03-22 PROCEDURE — 3430000001 HC RX 343 DIAGNOSTIC RADIOPHARMACEUTICALS: Performed by: FAMILY MEDICINE

## 2024-03-22 PROCEDURE — 78227 HEPATOBIL SYST IMAGE W/DRUG: CPT

## 2024-03-22 PROCEDURE — 76937 US GUIDE VASCULAR ACCESS: CPT

## 2024-03-22 PROCEDURE — 78227 HEPATOBIL SYST IMAGE W/DRUG: CPT | Performed by: NUCLEAR MEDICINE

## 2024-03-22 RX ORDER — KIT FOR THE PREPARATION OF TECHNETIUM TC 99M MEBROFENIN 45 MG/10ML
4.6 INJECTION, POWDER, LYOPHILIZED, FOR SOLUTION INTRAVENOUS
Status: COMPLETED | OUTPATIENT
Start: 2024-03-22 | End: 2024-03-22

## 2024-03-22 RX ORDER — SINCALIDE 5 UG/5ML
1.76 INJECTION, POWDER, LYOPHILIZED, FOR SOLUTION INTRAVENOUS ONCE
Status: CANCELLED | OUTPATIENT
Start: 2024-03-22 | End: 2024-03-22

## 2024-03-22 RX ADMIN — MEBROFENIN 4.6 MILLICURIE: 45 INJECTION, POWDER, LYOPHILIZED, FOR SOLUTION INTRAVENOUS at 16:00

## 2024-03-22 ASSESSMENT — ENCOUNTER SYMPTOMS
DEPRESSION: 0
LOSS OF SENSATION IN FEET: 0
OCCASIONAL FEELINGS OF UNSTEADINESS: 0

## 2024-03-25 RX ORDER — SINCALIDE 5 UG/5ML
INJECTION, POWDER, LYOPHILIZED, FOR SOLUTION INTRAVENOUS ONCE
Status: CANCELLED | OUTPATIENT
Start: 2024-03-25 | End: 2024-03-25

## 2024-03-25 RX ORDER — SINCALIDE 5 UG/5ML
1.76 INJECTION, POWDER, LYOPHILIZED, FOR SOLUTION INTRAVENOUS ONCE
Status: ACTIVE | OUTPATIENT
Start: 2024-03-25

## 2024-03-25 NOTE — ADDENDUM NOTE
Encounter addended by: Benedict Renteria on: 3/25/2024 12:02 PM   Actions taken: Imaging Exam ended, Order list changed

## 2024-03-27 ENCOUNTER — APPOINTMENT (OUTPATIENT)
Dept: SURGERY | Facility: CLINIC | Age: 29
End: 2024-03-27
Payer: COMMERCIAL

## 2024-03-29 ENCOUNTER — APPOINTMENT (OUTPATIENT)
Dept: SURGERY | Facility: CLINIC | Age: 29
End: 2024-03-29
Payer: COMMERCIAL

## 2024-04-04 ENCOUNTER — OFFICE VISIT (OUTPATIENT)
Dept: SURGERY | Facility: CLINIC | Age: 29
End: 2024-04-04
Payer: COMMERCIAL

## 2024-04-04 VITALS
TEMPERATURE: 98.3 F | RESPIRATION RATE: 16 BRPM | OXYGEN SATURATION: 100 % | WEIGHT: 194.2 LBS | SYSTOLIC BLOOD PRESSURE: 117 MMHG | HEART RATE: 75 BPM | DIASTOLIC BLOOD PRESSURE: 83 MMHG | HEIGHT: 63 IN | BODY MASS INDEX: 34.41 KG/M2

## 2024-04-04 DIAGNOSIS — K80.80 BILIARY CALCULUS OF OTHER SITE WITHOUT OBSTRUCTION: Primary | ICD-10-CM

## 2024-04-04 PROCEDURE — 99203 OFFICE O/P NEW LOW 30 MIN: CPT | Performed by: SURGERY

## 2024-04-04 RX ORDER — SODIUM CHLORIDE, SODIUM LACTATE, POTASSIUM CHLORIDE, CALCIUM CHLORIDE 600; 310; 30; 20 MG/100ML; MG/100ML; MG/100ML; MG/100ML
100 INJECTION, SOLUTION INTRAVENOUS CONTINUOUS
Status: CANCELLED | OUTPATIENT
Start: 2024-04-04

## 2024-04-04 RX ORDER — CEFAZOLIN SODIUM 2 G/100ML
2 INJECTION, SOLUTION INTRAVENOUS ONCE
Status: CANCELLED | OUTPATIENT
Start: 2024-04-04 | End: 2024-04-04

## 2024-04-04 RX ORDER — HEPARIN SODIUM 5000 [USP'U]/ML
5000 INJECTION, SOLUTION INTRAVENOUS; SUBCUTANEOUS ONCE
Status: CANCELLED | OUTPATIENT
Start: 2024-04-04 | End: 2024-04-04

## 2024-04-04 NOTE — H&P (VIEW-ONLY)
"History and Physical        Referring Provider: Dr. Ines Munoz     Chief Complaint:  Cholelithiasis with biliary colic     History of Present Illness:  This is a young healthy 29-year-old female who had her first biliary colic about a year ago after her third child and has been having epigastric and right upper quadrant pain on and off since.  She denies any nausea/vomiting, no fever/chills, no dark urine or acholic stools.        Past Medical History:  Anxiety/depression, migraines, bipolar disorder, nephrolithiasis, myalgia of the pelvic floor, cholelithiasis     Past Surgical History:  Tonsil removal, excision of fallopian tube for ectopic pregnancy        Medications:  As per medical record        Allergies:  Aripiprazole        Family History:  The information was reviewed and no pertinent findings are relevant to the presenting problem.        Social History:  Patient lives at home with her  and 3 children ages 2 4 and 8.  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  She denies smoking, occasionally drinks EtOH, denies IDU.        Review of Systems  A complete 10 point review of systems was performed and is negative except as noted in the history of present illness.          Physical Exam:   /83 (BP Location: Right arm, Patient Position: Sitting, BP Cuff Size: Adult)   Pulse 75   Temp 36.8 °C (98.3 °F) (Temporal)   Resp 16   Ht 1.6 m (5' 3\")   Wt 88.1 kg (194 lb 3.2 oz)   SpO2 100%   BMI 34.40 kg/m²     General: No acute distress.  Neuro: Alert and oriented ×3. Follows commands.  Head: Atraumatic  Eyes: Pupils equal reactive to light. Extraocular motions intact.  Ears: Hears normal speaking voice.  Mouth, Nose, Throat: Mucous membranes moist.  Normal dentition.  Neck: Supple. No appreciable masses.  Breast: Not examined.  Chest: No crepitus.  No appreciable scars.  Heart: Palpable regular rate and rhythm.  Lung: Clear to auscultation bilaterally.  Vascular: Palpable " radial pulses bilaterally.  Abdomen: Soft. Nondistended. Nontender.  No appreciable hernias or scars.  Rectal: Not examined.  Genitourinary: Not examined.  Musculoskeletal: Moves all extremities.  Normal range of motion.  Lymphatic: No palpable lymph nodes.  Skin: No rashes or lesions.  Psychological: Normal affect        Labs:  No new labs on record     Imaging: I have personally reviewed the images and the radiologist's report.  Ultrasound shows cholelithiasis with gallbladder wall thickening.  Hiatus shows no signs of cystic duct obstruction and gallbladder ejection fraction time is 39%.        Assessment:  This is a young 29-year-old female with cholelithiasis and biliary colic who would like to have her gallbladder removed.     The  risks benefits and indications for surgery were discussed with the patient.  The potential of bleeding, infection, myocardial infarction, pulmonary embolism, death, etc. were reviewed with the patient.  The potential of an open procedure was discussed with the patient.  Strategies for dealing with common bile duct stones to include open, endoscopic, and laparoscopic procedures were reviewed in detail.  Anticipated convalescence was reviewed in detail.  All questions were answered and consent was obtained.    Plan:  -- We will plan for a laparoscopic cholecystectomy with intraoperative cholangiogram 5/1/2024 with follow-up 5/16/2024.  -- We will plan for surgery to be performed as an outpatient.  -- Avoid eating fatty food pre-operatively.  -- We will apply Dermabond, so the patient may shower the day after surgery.  No swimming or tub soaks for 4 weeks post-operatively.  -- No heavy lifting >20lbs for 6 weeks after surgery.         Meagan Harrison MD MPH  General Surgery  Office: [phone]  Fax: [phone]

## 2024-04-04 NOTE — PROGRESS NOTES
"History and Physical        Referring Provider: Dr. Ines Munoz     Chief Complaint:  Cholelithiasis with biliary colic     History of Present Illness:  This is a young healthy 29-year-old female who had her first biliary colic about a year ago after her third child and has been having epigastric and right upper quadrant pain on and off since.  She denies any nausea/vomiting, no fever/chills, no dark urine or acholic stools.        Past Medical History:  Anxiety/depression, migraines, bipolar disorder, nephrolithiasis, myalgia of the pelvic floor, cholelithiasis     Past Surgical History:  Tonsil removal, excision of fallopian tube for ectopic pregnancy        Medications:  As per medical record        Allergies:  Aripiprazole        Family History:  The information was reviewed and no pertinent findings are relevant to the presenting problem.        Social History:  Patient lives at home with her  and 3 children ages 2 4 and 8.  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  She denies smoking, occasionally drinks EtOH, denies IDU.        Review of Systems  A complete 10 point review of systems was performed and is negative except as noted in the history of present illness.          Physical Exam:   /83 (BP Location: Right arm, Patient Position: Sitting, BP Cuff Size: Adult)   Pulse 75   Temp 36.8 °C (98.3 °F) (Temporal)   Resp 16   Ht 1.6 m (5' 3\")   Wt 88.1 kg (194 lb 3.2 oz)   SpO2 100%   BMI 34.40 kg/m²     General: No acute distress.  Neuro: Alert and oriented ×3. Follows commands.  Head: Atraumatic  Eyes: Pupils equal reactive to light. Extraocular motions intact.  Ears: Hears normal speaking voice.  Mouth, Nose, Throat: Mucous membranes moist.  Normal dentition.  Neck: Supple. No appreciable masses.  Breast: Not examined.  Chest: No crepitus.  No appreciable scars.  Heart: Palpable regular rate and rhythm.  Lung: Clear to auscultation bilaterally.  Vascular: Palpable " radial pulses bilaterally.  Abdomen: Soft. Nondistended. Nontender.  No appreciable hernias or scars.  Rectal: Not examined.  Genitourinary: Not examined.  Musculoskeletal: Moves all extremities.  Normal range of motion.  Lymphatic: No palpable lymph nodes.  Skin: No rashes or lesions.  Psychological: Normal affect        Labs:  No new labs on record     Imaging: I have personally reviewed the images and the radiologist's report.  Ultrasound shows cholelithiasis with gallbladder wall thickening.  Hiatus shows no signs of cystic duct obstruction and gallbladder ejection fraction time is 39%.        Assessment:  This is a young 29-year-old female with cholelithiasis and biliary colic who would like to have her gallbladder removed.     The  risks benefits and indications for surgery were discussed with the patient.  The potential of bleeding, infection, myocardial infarction, pulmonary embolism, death, etc. were reviewed with the patient.  The potential of an open procedure was discussed with the patient.  Strategies for dealing with common bile duct stones to include open, endoscopic, and laparoscopic procedures were reviewed in detail.  Anticipated convalescence was reviewed in detail.  All questions were answered and consent was obtained.    Plan:  -- We will plan for a laparoscopic cholecystectomy with intraoperative cholangiogram 5/1/2024 with follow-up 5/16/2024.  -- We will plan for surgery to be performed as an outpatient.  -- Avoid eating fatty food pre-operatively.  -- We will apply Dermabond, so the patient may shower the day after surgery.  No swimming or tub soaks for 4 weeks post-operatively.  -- No heavy lifting >20lbs for 6 weeks after surgery.         Meagan Harrison MD MPH  General Surgery  Office: [phone]  Fax: [phone]

## 2024-04-29 DIAGNOSIS — K21.9 GASTROESOPHAGEAL REFLUX DISEASE WITHOUT ESOPHAGITIS: ICD-10-CM

## 2024-04-29 DIAGNOSIS — R12 HEARTBURN: ICD-10-CM

## 2024-04-29 RX ORDER — PANTOPRAZOLE SODIUM 40 MG/1
40 TABLET, DELAYED RELEASE ORAL DAILY
Qty: 90 TABLET | Refills: 1 | Status: SHIPPED | OUTPATIENT
Start: 2024-04-29 | End: 2025-04-29

## 2024-05-01 ENCOUNTER — HOSPITAL ENCOUNTER (OUTPATIENT)
Facility: HOSPITAL | Age: 29
Setting detail: OUTPATIENT SURGERY
Discharge: HOME | End: 2024-05-01
Attending: SURGERY | Admitting: SURGERY
Payer: COMMERCIAL

## 2024-05-01 ENCOUNTER — ANESTHESIA EVENT (OUTPATIENT)
Dept: OPERATING ROOM | Facility: HOSPITAL | Age: 29
End: 2024-05-01
Payer: COMMERCIAL

## 2024-05-01 ENCOUNTER — APPOINTMENT (OUTPATIENT)
Dept: RADIOLOGY | Facility: HOSPITAL | Age: 29
End: 2024-05-01
Payer: COMMERCIAL

## 2024-05-01 ENCOUNTER — ANESTHESIA (OUTPATIENT)
Dept: OPERATING ROOM | Facility: HOSPITAL | Age: 29
End: 2024-05-01
Payer: COMMERCIAL

## 2024-05-01 VITALS
DIASTOLIC BLOOD PRESSURE: 62 MMHG | TEMPERATURE: 97.7 F | RESPIRATION RATE: 18 BRPM | BODY MASS INDEX: 33.66 KG/M2 | OXYGEN SATURATION: 98 % | WEIGHT: 190 LBS | SYSTOLIC BLOOD PRESSURE: 107 MMHG | HEIGHT: 63 IN | HEART RATE: 64 BPM

## 2024-05-01 DIAGNOSIS — K80.80 BILIARY CALCULUS OF OTHER SITE WITHOUT OBSTRUCTION: Primary | ICD-10-CM

## 2024-05-01 DIAGNOSIS — G89.18 POST-OP PAIN: ICD-10-CM

## 2024-05-01 LAB — HCG UR QL IA.RAPID: NEGATIVE

## 2024-05-01 PROCEDURE — 2500000005 HC RX 250 GENERAL PHARMACY W/O HCPCS: Performed by: SURGERY

## 2024-05-01 PROCEDURE — 7100000009 HC PHASE TWO TIME - INITIAL BASE CHARGE: Performed by: SURGERY

## 2024-05-01 PROCEDURE — 2500000004 HC RX 250 GENERAL PHARMACY W/ HCPCS (ALT 636 FOR OP/ED): Mod: JZ | Performed by: SURGERY

## 2024-05-01 PROCEDURE — 2500000004 HC RX 250 GENERAL PHARMACY W/ HCPCS (ALT 636 FOR OP/ED): Performed by: ANESTHESIOLOGY

## 2024-05-01 PROCEDURE — A47563 PR LAP,CHOLECYSTECTOMY/GRAPH: Performed by: ANESTHESIOLOGIST ASSISTANT

## 2024-05-01 PROCEDURE — 3600000003 HC OR TIME - INITIAL BASE CHARGE - PROCEDURE LEVEL THREE: Performed by: SURGERY

## 2024-05-01 PROCEDURE — 2720000007 HC OR 272 NO HCPCS: Performed by: SURGERY

## 2024-05-01 PROCEDURE — C1887 CATHETER, GUIDING: HCPCS | Performed by: SURGERY

## 2024-05-01 PROCEDURE — 47563 LAPARO CHOLECYSTECTOMY/GRAPH: CPT | Performed by: SURGERY

## 2024-05-01 PROCEDURE — 7100000001 HC RECOVERY ROOM TIME - INITIAL BASE CHARGE: Performed by: SURGERY

## 2024-05-01 PROCEDURE — 3700000001 HC GENERAL ANESTHESIA TIME - INITIAL BASE CHARGE: Performed by: SURGERY

## 2024-05-01 PROCEDURE — 3600000008 HC OR TIME - EACH INCREMENTAL 1 MINUTE - PROCEDURE LEVEL THREE: Performed by: SURGERY

## 2024-05-01 PROCEDURE — 96372 THER/PROPH/DIAG INJ SC/IM: CPT | Performed by: SURGERY

## 2024-05-01 PROCEDURE — 2500000005 HC RX 250 GENERAL PHARMACY W/O HCPCS: Performed by: ANESTHESIOLOGIST ASSISTANT

## 2024-05-01 PROCEDURE — 7100000002 HC RECOVERY ROOM TIME - EACH INCREMENTAL 1 MINUTE: Performed by: SURGERY

## 2024-05-01 PROCEDURE — 2500000004 HC RX 250 GENERAL PHARMACY W/ HCPCS (ALT 636 FOR OP/ED): Performed by: SURGERY

## 2024-05-01 PROCEDURE — 7100000010 HC PHASE TWO TIME - EACH INCREMENTAL 1 MINUTE: Performed by: SURGERY

## 2024-05-01 PROCEDURE — 0752T DGTZ GLS MCRSCP SLD LVL III: CPT | Mod: TC,STJLAB | Performed by: SURGERY

## 2024-05-01 PROCEDURE — A47563 PR LAP,CHOLECYSTECTOMY/GRAPH: Performed by: ANESTHESIOLOGY

## 2024-05-01 PROCEDURE — 81025 URINE PREGNANCY TEST: CPT | Performed by: SURGERY

## 2024-05-01 PROCEDURE — 88304 TISSUE EXAM BY PATHOLOGIST: CPT | Performed by: PATHOLOGY

## 2024-05-01 PROCEDURE — 3700000002 HC GENERAL ANESTHESIA TIME - EACH INCREMENTAL 1 MINUTE: Performed by: SURGERY

## 2024-05-01 PROCEDURE — 2500000004 HC RX 250 GENERAL PHARMACY W/ HCPCS (ALT 636 FOR OP/ED): Performed by: ANESTHESIOLOGIST ASSISTANT

## 2024-05-01 RX ORDER — ONDANSETRON HYDROCHLORIDE 2 MG/ML
4 INJECTION, SOLUTION INTRAVENOUS ONCE AS NEEDED
Status: COMPLETED | OUTPATIENT
Start: 2024-05-01 | End: 2024-05-01

## 2024-05-01 RX ORDER — ONDANSETRON HYDROCHLORIDE 2 MG/ML
INJECTION, SOLUTION INTRAVENOUS AS NEEDED
Status: DISCONTINUED | OUTPATIENT
Start: 2024-05-01 | End: 2024-05-01

## 2024-05-01 RX ORDER — OXYCODONE AND ACETAMINOPHEN 5; 325 MG/1; MG/1
1 TABLET ORAL EVERY 4 HOURS PRN
Status: DISCONTINUED | OUTPATIENT
Start: 2024-05-01 | End: 2024-05-01 | Stop reason: HOSPADM

## 2024-05-01 RX ORDER — ROCURONIUM BROMIDE 10 MG/ML
INJECTION, SOLUTION INTRAVENOUS AS NEEDED
Status: DISCONTINUED | OUTPATIENT
Start: 2024-05-01 | End: 2024-05-01

## 2024-05-01 RX ORDER — PROPOFOL 10 MG/ML
INJECTION, EMULSION INTRAVENOUS AS NEEDED
Status: DISCONTINUED | OUTPATIENT
Start: 2024-05-01 | End: 2024-05-01

## 2024-05-01 RX ORDER — DIPHENHYDRAMINE HYDROCHLORIDE 50 MG/ML
12.5 INJECTION INTRAMUSCULAR; INTRAVENOUS ONCE AS NEEDED
Status: DISCONTINUED | OUTPATIENT
Start: 2024-05-01 | End: 2024-05-01 | Stop reason: HOSPADM

## 2024-05-01 RX ORDER — ASPIRIN 81 MG
100 TABLET, DELAYED RELEASE (ENTERIC COATED) ORAL 2 TIMES DAILY
Qty: 60 TABLET | Refills: 1 | Status: SHIPPED | OUTPATIENT
Start: 2024-05-01 | End: 2024-05-08 | Stop reason: WASHOUT

## 2024-05-01 RX ORDER — MIDAZOLAM HYDROCHLORIDE 1 MG/ML
INJECTION, SOLUTION INTRAMUSCULAR; INTRAVENOUS AS NEEDED
Status: DISCONTINUED | OUTPATIENT
Start: 2024-05-01 | End: 2024-05-01

## 2024-05-01 RX ORDER — HYDRALAZINE HYDROCHLORIDE 20 MG/ML
5 INJECTION INTRAMUSCULAR; INTRAVENOUS EVERY 30 MIN PRN
Status: DISCONTINUED | OUTPATIENT
Start: 2024-05-01 | End: 2024-05-01 | Stop reason: HOSPADM

## 2024-05-01 RX ORDER — MIDAZOLAM HYDROCHLORIDE 1 MG/ML
1 INJECTION, SOLUTION INTRAMUSCULAR; INTRAVENOUS ONCE AS NEEDED
Status: DISCONTINUED | OUTPATIENT
Start: 2024-05-01 | End: 2024-05-01 | Stop reason: HOSPADM

## 2024-05-01 RX ORDER — OXYCODONE HYDROCHLORIDE 5 MG/1
5 TABLET ORAL EVERY 4 HOURS PRN
Status: DISCONTINUED | OUTPATIENT
Start: 2024-05-01 | End: 2024-05-01 | Stop reason: HOSPADM

## 2024-05-01 RX ORDER — CEFAZOLIN SODIUM 2 G/100ML
2 INJECTION, SOLUTION INTRAVENOUS ONCE
Status: COMPLETED | OUTPATIENT
Start: 2024-05-01 | End: 2024-05-01

## 2024-05-01 RX ORDER — ALBUTEROL SULFATE 0.83 MG/ML
2.5 SOLUTION RESPIRATORY (INHALATION) ONCE AS NEEDED
Status: DISCONTINUED | OUTPATIENT
Start: 2024-05-01 | End: 2024-05-01 | Stop reason: HOSPADM

## 2024-05-01 RX ORDER — LIDOCAINE HYDROCHLORIDE 10 MG/ML
0.1 INJECTION INFILTRATION; PERINEURAL ONCE
Status: DISCONTINUED | OUTPATIENT
Start: 2024-05-01 | End: 2024-05-01 | Stop reason: HOSPADM

## 2024-05-01 RX ORDER — HYDROMORPHONE HYDROCHLORIDE 1 MG/ML
INJECTION, SOLUTION INTRAMUSCULAR; INTRAVENOUS; SUBCUTANEOUS AS NEEDED
Status: DISCONTINUED | OUTPATIENT
Start: 2024-05-01 | End: 2024-05-01

## 2024-05-01 RX ORDER — GLYCOPYRROLATE 0.2 MG/ML
INJECTION INTRAMUSCULAR; INTRAVENOUS AS NEEDED
Status: DISCONTINUED | OUTPATIENT
Start: 2024-05-01 | End: 2024-05-01

## 2024-05-01 RX ORDER — ALBUTEROL SULFATE 0.83 MG/ML
2.5 SOLUTION RESPIRATORY (INHALATION)
Status: DISCONTINUED | OUTPATIENT
Start: 2024-05-01 | End: 2024-05-01 | Stop reason: HOSPADM

## 2024-05-01 RX ORDER — SODIUM CHLORIDE, SODIUM LACTATE, POTASSIUM CHLORIDE, CALCIUM CHLORIDE 600; 310; 30; 20 MG/100ML; MG/100ML; MG/100ML; MG/100ML
100 INJECTION, SOLUTION INTRAVENOUS CONTINUOUS
Status: DISCONTINUED | OUTPATIENT
Start: 2024-05-01 | End: 2024-05-01 | Stop reason: HOSPADM

## 2024-05-01 RX ORDER — METOCLOPRAMIDE HYDROCHLORIDE 5 MG/ML
10 INJECTION INTRAMUSCULAR; INTRAVENOUS ONCE AS NEEDED
Status: COMPLETED | OUTPATIENT
Start: 2024-05-01 | End: 2024-05-01

## 2024-05-01 RX ORDER — FENTANYL CITRATE 50 UG/ML
INJECTION, SOLUTION INTRAMUSCULAR; INTRAVENOUS AS NEEDED
Status: DISCONTINUED | OUTPATIENT
Start: 2024-05-01 | End: 2024-05-01

## 2024-05-01 RX ORDER — HYDROMORPHONE HYDROCHLORIDE 1 MG/ML
1 INJECTION, SOLUTION INTRAMUSCULAR; INTRAVENOUS; SUBCUTANEOUS EVERY 5 MIN PRN
Status: DISCONTINUED | OUTPATIENT
Start: 2024-05-01 | End: 2024-05-01 | Stop reason: HOSPADM

## 2024-05-01 RX ORDER — HEPARIN SODIUM 5000 [USP'U]/ML
5000 INJECTION, SOLUTION INTRAVENOUS; SUBCUTANEOUS ONCE
Status: COMPLETED | OUTPATIENT
Start: 2024-05-01 | End: 2024-05-01

## 2024-05-01 RX ORDER — SCOLOPAMINE TRANSDERMAL SYSTEM 1 MG/1
PATCH, EXTENDED RELEASE TRANSDERMAL AS NEEDED
Status: DISCONTINUED | OUTPATIENT
Start: 2024-05-01 | End: 2024-05-01

## 2024-05-01 RX ORDER — LABETALOL HYDROCHLORIDE 5 MG/ML
5 INJECTION, SOLUTION INTRAVENOUS
Status: DISCONTINUED | OUTPATIENT
Start: 2024-05-01 | End: 2024-05-01 | Stop reason: HOSPADM

## 2024-05-01 RX ORDER — OXYCODONE HYDROCHLORIDE 5 MG/1
5 TABLET ORAL EVERY 6 HOURS PRN
Qty: 5 TABLET | Refills: 0 | Status: SHIPPED | OUTPATIENT
Start: 2024-05-01 | End: 2024-05-08 | Stop reason: WASHOUT

## 2024-05-01 RX ORDER — DEXAMETHASONE SODIUM PHOSPHATE 10 MG/ML
INJECTION INTRAMUSCULAR; INTRAVENOUS AS NEEDED
Status: DISCONTINUED | OUTPATIENT
Start: 2024-05-01 | End: 2024-05-01

## 2024-05-01 RX ORDER — ACETAMINOPHEN 325 MG/1
975 TABLET ORAL ONCE
Status: DISCONTINUED | OUTPATIENT
Start: 2024-05-01 | End: 2024-05-01 | Stop reason: HOSPADM

## 2024-05-01 RX ORDER — HYDROCODONE BITARTRATE AND ACETAMINOPHEN 5; 325 MG/1; MG/1
1 TABLET ORAL EVERY 4 HOURS PRN
Status: DISCONTINUED | OUTPATIENT
Start: 2024-05-01 | End: 2024-05-01 | Stop reason: HOSPADM

## 2024-05-01 RX ORDER — BUPIVACAINE HCL/EPINEPHRINE 0.5-1:200K
VIAL (ML) INJECTION AS NEEDED
Status: DISCONTINUED | OUTPATIENT
Start: 2024-05-01 | End: 2024-05-01 | Stop reason: HOSPADM

## 2024-05-01 RX ORDER — LIDOCAINE HYDROCHLORIDE 20 MG/ML
INJECTION, SOLUTION INFILTRATION; PERINEURAL AS NEEDED
Status: DISCONTINUED | OUTPATIENT
Start: 2024-05-01 | End: 2024-05-01

## 2024-05-01 RX ORDER — LABETALOL HYDROCHLORIDE 5 MG/ML
5 INJECTION, SOLUTION INTRAVENOUS ONCE AS NEEDED
Status: DISCONTINUED | OUTPATIENT
Start: 2024-05-01 | End: 2024-05-01 | Stop reason: HOSPADM

## 2024-05-01 RX ORDER — NEOSTIGMINE METHYLSULFATE 1 MG/ML
INJECTION, SOLUTION INTRAVENOUS AS NEEDED
Status: DISCONTINUED | OUTPATIENT
Start: 2024-05-01 | End: 2024-05-01

## 2024-05-01 RX ORDER — PHENYLEPHRINE HCL IN 0.9% NACL 1 MG/10 ML
SYRINGE (ML) INTRAVENOUS AS NEEDED
Status: DISCONTINUED | OUTPATIENT
Start: 2024-05-01 | End: 2024-05-01

## 2024-05-01 RX ADMIN — SODIUM CHLORIDE, POTASSIUM CHLORIDE, SODIUM LACTATE AND CALCIUM CHLORIDE: 600; 310; 30; 20 INJECTION, SOLUTION INTRAVENOUS at 11:49

## 2024-05-01 RX ADMIN — METOCLOPRAMIDE 10 MG: 5 INJECTION, SOLUTION INTRAMUSCULAR; INTRAVENOUS at 15:30

## 2024-05-01 RX ADMIN — FENTANYL CITRATE 100 MCG: 50 INJECTION, SOLUTION INTRAMUSCULAR; INTRAVENOUS at 11:53

## 2024-05-01 RX ADMIN — CEFAZOLIN SODIUM 2 G: 2 INJECTION, SOLUTION INTRAVENOUS at 12:00

## 2024-05-01 RX ADMIN — ONDANSETRON 4 MG: 2 INJECTION INTRAMUSCULAR; INTRAVENOUS at 14:11

## 2024-05-01 RX ADMIN — HYDROMORPHONE HYDROCHLORIDE 0.5 MG: 1 INJECTION, SOLUTION INTRAMUSCULAR; INTRAVENOUS; SUBCUTANEOUS at 13:54

## 2024-05-01 RX ADMIN — DEXAMETHASONE SODIUM PHOSPHATE 10 MG: 10 INJECTION INTRAMUSCULAR; INTRAVENOUS at 11:53

## 2024-05-01 RX ADMIN — HYDROMORPHONE HYDROCHLORIDE 0.5 MG: 1 INJECTION, SOLUTION INTRAMUSCULAR; INTRAVENOUS; SUBCUTANEOUS at 13:34

## 2024-05-01 RX ADMIN — HYDROMORPHONE HYDROCHLORIDE 0.5 MG: 1 INJECTION, SOLUTION INTRAMUSCULAR; INTRAVENOUS; SUBCUTANEOUS at 14:33

## 2024-05-01 RX ADMIN — HYDROMORPHONE HYDROCHLORIDE 0.5 MG: 1 INJECTION, SOLUTION INTRAMUSCULAR; INTRAVENOUS; SUBCUTANEOUS at 14:10

## 2024-05-01 RX ADMIN — ROCURONIUM BROMIDE 10 MG: 10 INJECTION INTRAVENOUS at 12:41

## 2024-05-01 RX ADMIN — HYDROMORPHONE HYDROCHLORIDE 1 MG: 1 INJECTION, SOLUTION INTRAMUSCULAR; INTRAVENOUS; SUBCUTANEOUS at 13:42

## 2024-05-01 RX ADMIN — MIDAZOLAM 2 MG: 1 INJECTION INTRAMUSCULAR; INTRAVENOUS at 11:54

## 2024-05-01 RX ADMIN — HYDROMORPHONE HYDROCHLORIDE 0.5 MG: 1 INJECTION, SOLUTION INTRAMUSCULAR; INTRAVENOUS; SUBCUTANEOUS at 14:41

## 2024-05-01 RX ADMIN — NEOSTIGMINE METHYLSULFATE 4 MG: 1 INJECTION INTRAVENOUS at 13:21

## 2024-05-01 RX ADMIN — HEPARIN SODIUM 5000 UNITS: 5000 INJECTION INTRAVENOUS; SUBCUTANEOUS at 08:54

## 2024-05-01 RX ADMIN — HYDROMORPHONE HYDROCHLORIDE 0.5 MG: 1 INJECTION, SOLUTION INTRAMUSCULAR; INTRAVENOUS; SUBCUTANEOUS at 13:21

## 2024-05-01 RX ADMIN — LIDOCAINE HYDROCHLORIDE 100 MG: 20 INJECTION, SOLUTION INFILTRATION; PERINEURAL at 11:53

## 2024-05-01 RX ADMIN — ONDANSETRON 4 MG: 2 INJECTION, SOLUTION INTRAMUSCULAR; INTRAVENOUS at 13:16

## 2024-05-01 RX ADMIN — ROCURONIUM BROMIDE 50 MG: 10 INJECTION INTRAVENOUS at 11:53

## 2024-05-01 RX ADMIN — GLYCOPYRROLATE 0.6 MG: 0.2 INJECTION, SOLUTION INTRAMUSCULAR; INTRAVENOUS at 13:21

## 2024-05-01 RX ADMIN — ROCURONIUM BROMIDE 5 MG: 10 INJECTION INTRAVENOUS at 13:04

## 2024-05-01 RX ADMIN — Medication 100 MCG: at 13:00

## 2024-05-01 RX ADMIN — SCOLOPAMINE TRANSDERMAL SYSTEM 1 PATCH: 1 PATCH, EXTENDED RELEASE TRANSDERMAL at 09:05

## 2024-05-01 RX ADMIN — PROPOFOL 200 MG: 10 INJECTION, EMULSION INTRAVENOUS at 11:53

## 2024-05-01 RX ADMIN — Medication 20 MG: at 11:53

## 2024-05-01 RX ADMIN — Medication 200 MCG: at 12:46

## 2024-05-01 ASSESSMENT — PAIN SCALES - GENERAL
PAINLEVEL_OUTOF10: 0 - NO PAIN
PAINLEVEL_OUTOF10: 6
PAINLEVEL_OUTOF10: 6
PAINLEVEL_OUTOF10: 4
PAINLEVEL_OUTOF10: 5 - MODERATE PAIN
PAINLEVEL_OUTOF10: 4
PAINLEVEL_OUTOF10: 4
PAINLEVEL_OUTOF10: 6
PAINLEVEL_OUTOF10: 5 - MODERATE PAIN
PAINLEVEL_OUTOF10: 5 - MODERATE PAIN
PAINLEVEL_OUTOF10: 6

## 2024-05-01 ASSESSMENT — PAIN DESCRIPTION - ORIENTATION
ORIENTATION: LOWER
ORIENTATION: MID;LOWER
ORIENTATION: MID
ORIENTATION: MID;LOWER

## 2024-05-01 ASSESSMENT — PAIN DESCRIPTION - LOCATION
LOCATION: ABDOMEN

## 2024-05-01 ASSESSMENT — COLUMBIA-SUICIDE SEVERITY RATING SCALE - C-SSRS
6. HAVE YOU EVER DONE ANYTHING, STARTED TO DO ANYTHING, OR PREPARED TO DO ANYTHING TO END YOUR LIFE?: NO
2. HAVE YOU ACTUALLY HAD ANY THOUGHTS OF KILLING YOURSELF?: NO
1. IN THE PAST MONTH, HAVE YOU WISHED YOU WERE DEAD OR WISHED YOU COULD GO TO SLEEP AND NOT WAKE UP?: NO

## 2024-05-01 NOTE — ANESTHESIA POSTPROCEDURE EVALUATION
Patient: Jennifer Landers    Procedure Summary       Date: 05/01/24 Room / Location: [facility] OR 07 / Virtual [facility] OR    Anesthesia Start: 1140 Anesthesia Stop: 1351    Procedure: Cholecystectomy Laparoscopy with Cholangiogram (Abdomen) Diagnosis:       Biliary calculus of other site without obstruction      (Biliary calculus of other site without obstruction [K80.80])    Surgeons: Meagan Harrison MD Responsible Provider: Santi Agudelo MD    Anesthesia Type: general ASA Status: 2            Anesthesia Type: general    Vitals Value Taken Time   /65 05/01/24 1348   Temp 36.3 05/01/24 1352   Pulse 75 05/01/24 1350   Resp 12 05/01/24 1352   SpO2 100 % 05/01/24 1350   Vitals shown include unfiled device data.    Anesthesia Post Evaluation    Patient location during evaluation: PACU  Patient participation: complete - patient participated  Level of consciousness: awake and alert  Pain management: adequate  Airway patency: patent  Cardiovascular status: acceptable  Respiratory status: acceptable and face mask  Hydration status: acceptable  Postoperative Nausea and Vomiting: none        No notable events documented.

## 2024-05-01 NOTE — ANESTHESIA PROCEDURE NOTES
Airway  Date/Time: 5/1/2024 11:56 AM  Urgency: elective    Airway not difficult    Staffing  Performed: CRISTIANO   Authorized by: Santi Agudelo MD    Performed by: CRISTIANO Harris  Patient location during procedure: OR    Indications and Patient Condition  Indications for airway management: anesthesia  Spontaneous Ventilation: absent  Sedation level: deep  Preoxygenated: yes  Patient position: sniffing  MILS maintained throughout  Mask difficulty assessment: 1 - vent by mask    Final Airway Details  Final airway type: endotracheal airway      Successful airway: ETT  Cuffed: yes   Successful intubation technique: direct laryngoscopy  Endotracheal tube insertion site: oral  Blade: Hubert  Blade size: #3  ETT size (mm): 7.5  Cormack-Lehane Classification: grade I - full view of glottis  Placement verified by: chest auscultation and capnometry   Cuff volume (mL): 6  Measured from: lips  ETT to lips (cm): 22  Number of attempts at approach: 1

## 2024-05-01 NOTE — OP NOTE
Cholecystectomy Laparoscopy with Cholangiogram Operative Note     Date: 2024  OR Location: [facility] OR    Name: Jennifer Landers, : 1995, Age: 29 y.o., MRN: [id number], Sex: female    Diagnosis  Pre-op Diagnosis     * Biliary calculus of other site without obstruction [K80.80] Post-op Diagnosis     * Biliary calculus of other site without obstruction [K80.80]     Procedures  Cholecystectomy Laparoscopy with Cholangiogram  [id number] - [place] LAPS SURG CHOLECYSTECTOMY W/CHOLANGIOGRAPHY      Surgeons      * Meagan Harrison - Primary    Resident/Fellow/Other Assistant:  Surgeons and Role:  * No surgeons found with a matching role *    Procedure Summary  Anesthesia: General  ASA: II  Anesthesia Staff: Anesthesiologist: Santi Agudelo MD  C-AA: CRISTIANO Rojas; CRISTIANO Harris  Estimated Blood Loss: 2mL  Intra-op Medications:   Administrations occurring from 1000 to 1230 on 24:   Medication Name Total Dose   lactated Ringer's infusion Cannot be calculated   ceFAZolin in dextrose (iso-os) (Ancef) IVPB 2 g 2 g              Anesthesia Record               Intraprocedure I/O Totals          Intake    lactated Ringer's infusion 800.00 mL    Total Intake 800 mL          Specimen:   ID Type Source Tests Collected by Time   1 : gallbladder Tissue GALLBLADDER CHOLECYSTECTOMY SURGICAL PATHOLOGY EXAM Meagan Harrison MD 2024 1312        Staff:   Circulator: Yanci Ritchie, HASMUKH; Cristal Romero RN  Relief Scrub: Lynette Gee RN  Scrub Person: Rochelle Ding         Drains and/or Catheters: * None in log *    Tourniquet Times:         Implants:     Findings: Umbilical hernia present.  Normal anatomy of gallbladder.  Cholangiogram shows ducts patent proximally and distally, no stones present.    Indications: Jennifer Landers is an 29 y.o. female who is having surgery for Biliary calculus of other site without obstruction [K80.80].     The risks benefits and indications for surgery were discussed with the  patient. The potential of bleeding, infection, myocardial infarction, and pulmonary embolism were reviewed with the patient. The potential of an open procedure was discussed with  the patient. Strategies for dealing with common bile duct stones to include open, endoscopic, and laparoscopic procedures were reviewed in detail. Anticipated convalescence was reviewed in detail. All questions were answered and consent was obtained.       DESCRIPTION OF PROCEDURE:   The patient was taken to the operating room and placed on the operative table in supine position.  Following induction of general anesthetic, the patient was intubated endotracheally.  Time-out had been performed per protocol, and she received preoperative  IV antibiotics and DVT prophylaxis.  Following intubation, an orogastric tube was placed, left arm was tucked, and the abdominal wall was prepped, and draped sterilely. The abdomen was entered via a 12 mm open Jay technique infraumbilically and the port was placed through an existing umbilical hernia defect.  The abdomen was insufflated  with carbon dioxide to a pressure of 15 mmHg. Then the she was placed in a reverse Trendelenburg position with right side up. A 12 mm subxiphoid port was next placed, and 5 mm right lateral subcostal and right lateral mid abdominal ports were placed under direct  visualization.  The gallbladder was grasped at the fundus and  retracted cephalad.  The lateral aspect of the gallbladder was initially dissected identifying the infundibular cystic duct junction.  With inferior and lateral retraction of the gallbladder, cystic duct and cystic artery were circumferentially dissected identifying the critical view with visualization of the liver between the 2 tubular structures.  The cystic artery was very small and therefore ligated using electrocautery. A clip was placed on the gallbladder side of the cystic duct before making a small ductotomy. Cholangiogram was performed and  it showed patent ducts throughout the liver and down into the duodenum. The cystic  duct had a generous cuff before entering the CBD. The cystic duct was clipped twice distally and the ductotomy was completed. The cystic artery was already clipped and divided. The gallbladder was removed off of the gallbladder fossa with hook cautery and placed in endocatch  bag and brought out through the subxiphoid port. We then went back into the abdomen to irrigate and suction the small amount of bile that was released.  Evaluation of the area showed no evidence of bleeding or viscous or  bilious leakage. Ports were removed under direct visualization after desufflation. Subxiphoid and infraumbilical ports were closed at the fascial level with a figure of eight 0 Vicryl suture, 3-0 Vicryl single stitch was placed to bring together the deep  dermis of the 12mm ports, and the skin was closed at all sites with subcuticular 4-0 Monocryl suture.  0.25% Marcaine with epi was instilled. Dermabond applied.  Instrument, sponge, and needle counts were correct x2.  Surgeon and surgical assistant were present throughout  the entire procedure.  The patient was taken to the recovery room in stable condition.           Meagan Harrison MD MPH   General Surgery   [phone]   Office: [phone]   Fax: [phone]     Complications:  None; patient tolerated the procedure well.    Disposition: PACU - hemodynamically stable.  Condition: stable         Additional Details: Patient may shower tomorrow.    Attending Attestation: I was present and scrubbed for the entire procedure.    Meagan Harrison  Phone Number: [phone]

## 2024-05-01 NOTE — ANESTHESIA PREPROCEDURE EVALUATION
Patient: Jennifer Landers    Procedure Information       Date/Time: 05/01/24 1000    Procedure: Cholecystectomy Laparoscopy with Cholangiogram    Location: [facility] / [facility]    Surgeons: Meagan Harrison MD            Relevant Problems   Cardiac   (+) Atypical chest pain      Neuro   (+) Anxiety and depression   (+) Bipolar disorder (Multi)      GI   (+) GERD (gastroesophageal reflux disease)      /Renal   (+) Nephrolithiasis      Liver   (+) Cholelithiasis      HEENT   (+) Sinus infection      ID   (+) Bacterial vaginosis   (+) Genital herpes   (+) Yeast infection      GYN   (+) High risk pregnancy with low PAPPA (pregnancy-associated plasma protein A) ([facility])       Clinical information reviewed:   Tobacco  Allergies  Meds   Med Hx  Surg Hx   Fam Hx  Soc Hx        NPO Detail:  NPO/Void Status  Date of Last Liquid: 05/01/24  Time of Last Liquid: 0530 (sip of water with pill)  Date of Last Solid: 04/30/24  Time of Last Solid: 1800         Physical Exam    Airway  Mallampati: II  TM distance: >3 FB  Neck ROM: full     Cardiovascular - normal exam     Dental - normal exam     Pulmonary - normal exam     Abdominal - normal exam           Vitals:    05/01/24 0846   BP: 119/81   Pulse: 85   Resp: 18   Temp: 36.7 °C (98.1 °F)   SpO2: 100%       Past Surgical History:   Procedure Laterality Date    OTHER SURGICAL HISTORY  02/09/2021    Ectopic pregnancy removal     Past Medical History:   Diagnosis Date    Acute vaginitis 06/08/2020    Acute vaginitis    Encounter for supervision of normal pregnancy, unspecified, unspecified trimester ([facility]) 08/09/2019    Encounter for supervision of normal pregnancy    Encounter for supervision of normal pregnancy, unspecified, unspecified trimester ([facility]) 10/22/2019    Prenatal care    Other complications of the puerperium, not elsewhere classified ([facility]) 06/16/2020    Postpartum headache    Other conditions influencing health status 07/23/2019    History of  pregnancy    Other viral diseases complicating pregnancy, unspecified trimester ([facility]) 08/09/2019    Herpes simplex infection in pregnancy    Personal history of diseases of the blood and blood-forming organs and certain disorders involving the immune mechanism 11/11/2019    History of anemia    Personal history of diseases of the skin and subcutaneous tissue 08/09/2019    History of contact dermatitis    Personal history of other diseases of the female genital tract 07/23/2019    History of vaginal discharge    Personal history of other infectious and parasitic diseases 05/15/2020    History of herpes simplex infection    Personal history of other specified conditions 05/04/2020    History of headache    Personal history of other specified conditions 11/18/2019    History of fatigue    Personal history of other specified conditions 12/03/2019    History of weight gain    Sacrococcygeal disorders, not elsewhere classified 11/12/2020    Coccydynia    Secondary amenorrhea 11/11/2019    Secondary amenorrhea    Unspecified infection of urinary tract in pregnancy, unspecified trimester ([facility]) 10/05/2020    UTI in pregnancy    Unspecified tubal pregnancy without intrauterine pregnancy ([facility]) 10/05/2020    Ectopic pregnancy, tubal       Current Facility-Administered Medications:     ceFAZolin in dextrose (iso-os) (Ancef) IVPB 2 g, 2 g, intravenous, Once, Meagan Harrison MD    lactated Ringer's infusion, 100 mL/hr, intravenous, Continuous, Meagan Harrison MD    Facility-Administered Medications Ordered in Other Encounters:     sincalide (Kinevac) injection 1.8 mcg, 1.8 mcg, intravenous, Once, Ines Diaz, DO  Prior to Admission medications    Medication Sig Start Date End Date Taking? Authorizing Provider   benzoyl peroxide (Benzac AC) 10 % external wash Apply 1 Application topically once daily. 1/16/24 1/15/25 Yes UDAY Cooper-CNP   clindamycin (Cleocin T) 1 % lotion Apply topically 2 times a  day. 1/16/24  Yes JAMIL Cooper   pantoprazole (ProtoNix) 40 mg EC tablet Take 1 tablet (40 mg) by mouth once daily. DO NOT CRUSH CHEW OR SPLIT 4/29/24 4/29/25 Yes Ines Diaz, DO   tretinoin (Retin-A) 0.05 % cream Apply topically once daily at bedtime. Apply a pea size amount nightly to entire face. Moisturize as needed 1/16/24 1/15/25  Jennifer BARNETT JAMIL Tirado   pantoprazole (ProtoNix) 40 mg EC tablet Take 1 tablet (40 mg) by mouth once daily. Do not crush, chew, or split. 1/29/24 4/29/24  Ines Diaz, DO     Allergies   Allergen Reactions    Aripiprazole Swelling     Social History     Tobacco Use    Smoking status: Never    Smokeless tobacco: Never   Substance Use Topics    Alcohol use: Not on file         Chemistry    Lab Results   Component Value Date/Time     03/22/2023 0931    K 4.4 03/22/2023 0931     03/22/2023 0931    CO2 26 03/22/2023 0931    BUN 15 03/22/2023 0931    CREATININE 0.85 03/22/2023 0931    Lab Results   Component Value Date/Time    CALCIUM 9.6 03/22/2023 0931    ALKPHOS 72 03/22/2023 0931    AST 13 03/22/2023 0931    ALT 13 03/22/2023 0931    BILITOT 0.5 03/22/2023 0931          Lab Results   Component Value Date/Time    WBC 9.0 03/22/2023 0931    HGB 13.8 03/22/2023 0931    HCT 42.5 03/22/2023 0931     03/22/2023 0931     Lab Results   Component Value Date/Time    PROTIME 11.3 09/22/2020 1520    INR 1.0 09/22/2020 1520     No results found for this or any previous visit (from the past 4464 hour(s)).   Anesthesia Plan    History of general anesthesia?: yes  History of complications of general anesthesia?: no    ASA 2     general     intravenous induction   Anesthetic plan and risks discussed with patient.    Plan discussed with [facility].

## 2024-05-07 ENCOUNTER — TELEPHONE (OUTPATIENT)
Dept: SURGERY | Facility: CLINIC | Age: 29
End: 2024-05-07
Payer: COMMERCIAL

## 2024-05-07 NOTE — TELEPHONE ENCOUNTER
Spoke with pt on the phone in regards to her message sent about her increased resting heart rate.  Pt denies chest pain, shortness of breath, fevers, vomiting or passing out.  Pt does admit to feeling lightheaded.  Pt is drinking po fluids without difficulty or adverse reaction.  Pt states symptoms have been since surgery and have not improved nor have they worsened.  Pt does agree to calling her pcp to get an office appt today or tomorrow however if symptoms worsen, heart rate increases, chest pain, sob or passing out she is to go directly to ED.  Pt agrees with plan of care.  Dr. Harrison aware.

## 2024-05-08 ENCOUNTER — HOSPITAL ENCOUNTER (OUTPATIENT)
Dept: RADIOLOGY | Facility: HOSPITAL | Age: 29
Discharge: HOME | End: 2024-05-08
Payer: COMMERCIAL

## 2024-05-08 ENCOUNTER — HOSPITAL ENCOUNTER (EMERGENCY)
Facility: HOSPITAL | Age: 29
Discharge: HOME | End: 2024-05-08
Payer: COMMERCIAL

## 2024-05-08 ENCOUNTER — OFFICE VISIT (OUTPATIENT)
Dept: PRIMARY CARE | Facility: CLINIC | Age: 29
End: 2024-05-08
Payer: COMMERCIAL

## 2024-05-08 VITALS
RESPIRATION RATE: 16 BRPM | TEMPERATURE: 98.3 F | DIASTOLIC BLOOD PRESSURE: 80 MMHG | HEART RATE: 88 BPM | WEIGHT: 190 LBS | OXYGEN SATURATION: 99 % | BODY MASS INDEX: 33.66 KG/M2 | SYSTOLIC BLOOD PRESSURE: 122 MMHG

## 2024-05-08 VITALS
OXYGEN SATURATION: 100 % | WEIGHT: 190 LBS | BODY MASS INDEX: 33.66 KG/M2 | SYSTOLIC BLOOD PRESSURE: 115 MMHG | RESPIRATION RATE: 17 BRPM | HEIGHT: 63 IN | HEART RATE: 108 BPM | DIASTOLIC BLOOD PRESSURE: 86 MMHG | TEMPERATURE: 98.4 F

## 2024-05-08 DIAGNOSIS — R06.02 SHORTNESS OF BREATH: ICD-10-CM

## 2024-05-08 DIAGNOSIS — R00.0 TACHYCARDIA: Primary | ICD-10-CM

## 2024-05-08 DIAGNOSIS — R00.0 TACHYCARDIA: ICD-10-CM

## 2024-05-08 DIAGNOSIS — M79.601 RIGHT ARM PAIN: Primary | ICD-10-CM

## 2024-05-08 LAB
LABORATORY COMMENT REPORT: NORMAL
PATH REPORT.FINAL DX SPEC: NORMAL
PATH REPORT.GROSS SPEC: NORMAL
PATH REPORT.MICROSCOPIC SPEC OTHER STN: NORMAL
PATH REPORT.RELEVANT HX SPEC: NORMAL
PATH REPORT.TOTAL CANCER: NORMAL

## 2024-05-08 PROCEDURE — 99214 OFFICE O/P EST MOD 30 MIN: CPT | Performed by: FAMILY MEDICINE

## 2024-05-08 PROCEDURE — 93000 ELECTROCARDIOGRAM COMPLETE: CPT | Performed by: FAMILY MEDICINE

## 2024-05-08 PROCEDURE — 2550000001 HC RX 255 CONTRASTS: Performed by: FAMILY MEDICINE

## 2024-05-08 PROCEDURE — 71275 CT ANGIOGRAPHY CHEST: CPT | Performed by: RADIOLOGY

## 2024-05-08 PROCEDURE — 71275 CT ANGIOGRAPHY CHEST: CPT

## 2024-05-08 PROCEDURE — 99281 EMR DPT VST MAYX REQ PHY/QHP: CPT

## 2024-05-08 RX ADMIN — IOHEXOL 60 ML: 350 INJECTION, SOLUTION INTRAVENOUS at 17:22

## 2024-05-08 ASSESSMENT — PAIN DESCRIPTION - LOCATION: LOCATION: ARM

## 2024-05-08 ASSESSMENT — LIFESTYLE VARIABLES
HAVE YOU EVER FELT YOU SHOULD CUT DOWN ON YOUR DRINKING: NO
HAVE PEOPLE ANNOYED YOU BY CRITICIZING YOUR DRINKING: NO
EVER HAD A DRINK FIRST THING IN THE MORNING TO STEADY YOUR NERVES TO GET RID OF A HANGOVER: NO
EVER FELT BAD OR GUILTY ABOUT YOUR DRINKING: NO
TOTAL SCORE: 0

## 2024-05-08 ASSESSMENT — COLUMBIA-SUICIDE SEVERITY RATING SCALE - C-SSRS
1. IN THE PAST MONTH, HAVE YOU WISHED YOU WERE DEAD OR WISHED YOU COULD GO TO SLEEP AND NOT WAKE UP?: NO
6. HAVE YOU EVER DONE ANYTHING, STARTED TO DO ANYTHING, OR PREPARED TO DO ANYTHING TO END YOUR LIFE?: NO
2. HAVE YOU ACTUALLY HAD ANY THOUGHTS OF KILLING YOURSELF?: NO

## 2024-05-08 ASSESSMENT — PAIN DESCRIPTION - DESCRIPTORS: DESCRIPTORS: DISCOMFORT

## 2024-05-08 ASSESSMENT — PAIN SCALES - GENERAL: PAINLEVEL_OUTOF10: 4

## 2024-05-08 ASSESSMENT — PAIN DESCRIPTION - ORIENTATION: ORIENTATION: RIGHT

## 2024-05-08 ASSESSMENT — PAIN DESCRIPTION - PAIN TYPE: TYPE: ACUTE PAIN

## 2024-05-08 ASSESSMENT — PAIN - FUNCTIONAL ASSESSMENT: PAIN_FUNCTIONAL_ASSESSMENT: 0-10

## 2024-05-08 NOTE — PROGRESS NOTES
Subjective   Patient ID: Jennifer Landers is a 29 y.o. female who presents for Rapid Heart Rate (Since surgery last week, patient has been experiencing elevated heart rate ).    She had her gallbladder removed and was discharged the same day last Wednesday.  Then once she got home she started with her current symptoms. She felt like she couldn't take a deep breath.  She called her mom and checked her pulse ox.  She states oxygen was fine but heartrate was mnsaw180's - the highest it has gone is 115bpm.  She was getting lightheaded and she feels like she can't catch her breath.  She denies any history of blood clots or clotting disorders. No pain when she breathes.           Review of Systems    Objective   /80   Pulse 88   Temp 36.8 °C (98.3 °F)   Resp 16   Wt 86.2 kg (190 lb)   LMP 04/25/2024 (Approximate)   SpO2 99%   BMI 33.66 kg/m²     Physical Exam  Constitutional:       Appearance: Normal appearance.   HENT:      Head: Normocephalic.   Eyes:      Pupils: Pupils are equal, round, and reactive to light.   Cardiovascular:      Rate and Rhythm: Normal rate and regular rhythm.   Pulmonary:      Effort: Pulmonary effort is normal.      Breath sounds: Normal breath sounds.   Musculoskeletal:      Cervical back: Normal range of motion.   Skin:     General: Skin is warm.   Neurological:      General: No focal deficit present.      Mental Status: She is alert and oriented to person, place, and time.   Psychiatric:         Mood and Affect: Mood normal.         Assessment/Plan   Diagnoses and all orders for this visit:  Tachycardia  -     ECG 12 Lead  -     CBC and Auto Differential; Future  -     Comprehensive Metabolic Panel; Future  -     CT angio chest for pulmonary embolism; Future  -     Iron and TIBC; Future  Shortness of breath  -     CBC and Auto Differential; Future  -     Comprehensive Metabolic Panel; Future  -     CT angio chest for pulmonary embolism; Future

## 2024-05-08 NOTE — PATIENT INSTRUCTIONS
Today we have followed up on your post surgical symptoms after having your gallbladder removed.    Due to recent surgery and episodes of shortness of breath and tachycardia I have advised that we rule out a blood clot getting a CT angio.  I have also ordered labs including a cbc and cmp and iron.     Follow up when results received.

## 2024-05-09 ENCOUNTER — LAB (OUTPATIENT)
Dept: LAB | Facility: LAB | Age: 29
End: 2024-05-09
Payer: COMMERCIAL

## 2024-05-09 DIAGNOSIS — R00.0 TACHYCARDIA: ICD-10-CM

## 2024-05-09 DIAGNOSIS — R06.02 SHORTNESS OF BREATH: ICD-10-CM

## 2024-05-09 LAB
ALBUMIN SERPL BCP-MCNC: 5 G/DL (ref 3.4–5)
ALP SERPL-CCNC: 80 U/L (ref 33–110)
ALT SERPL W P-5'-P-CCNC: 54 U/L (ref 7–45)
ANION GAP SERPL CALC-SCNC: 13 MMOL/L (ref 10–20)
AST SERPL W P-5'-P-CCNC: 30 U/L (ref 9–39)
BASOPHILS # BLD AUTO: 0.06 X10*3/UL (ref 0–0.1)
BASOPHILS NFR BLD AUTO: 0.6 %
BILIRUB SERPL-MCNC: 0.6 MG/DL (ref 0–1.2)
BUN SERPL-MCNC: 13 MG/DL (ref 6–23)
CALCIUM SERPL-MCNC: 10 MG/DL (ref 8.6–10.3)
CHLORIDE SERPL-SCNC: 104 MMOL/L (ref 98–107)
CO2 SERPL-SCNC: 25 MMOL/L (ref 21–32)
CREAT SERPL-MCNC: 0.8 MG/DL (ref 0.5–1.05)
EGFRCR SERPLBLD CKD-EPI 2021: >90 ML/MIN/1.73M*2
EOSINOPHIL # BLD AUTO: 0.14 X10*3/UL (ref 0–0.7)
EOSINOPHIL NFR BLD AUTO: 1.4 %
ERYTHROCYTE [DISTWIDTH] IN BLOOD BY AUTOMATED COUNT: 11.9 % (ref 11.5–14.5)
GLUCOSE SERPL-MCNC: 72 MG/DL (ref 74–99)
HCT VFR BLD AUTO: 46 % (ref 36–46)
HGB BLD-MCNC: 14.8 G/DL (ref 12–16)
IMM GRANULOCYTES # BLD AUTO: 0.06 X10*3/UL (ref 0–0.7)
IMM GRANULOCYTES NFR BLD AUTO: 0.6 % (ref 0–0.9)
IRON SATN MFR SERPL: 40 % (ref 25–45)
IRON SERPL-MCNC: 136 UG/DL (ref 35–150)
LYMPHOCYTES # BLD AUTO: 2.35 X10*3/UL (ref 1.2–4.8)
LYMPHOCYTES NFR BLD AUTO: 22.9 %
MCH RBC QN AUTO: 30.3 PG (ref 26–34)
MCHC RBC AUTO-ENTMCNC: 32.2 G/DL (ref 32–36)
MCV RBC AUTO: 94 FL (ref 80–100)
MONOCYTES # BLD AUTO: 0.7 X10*3/UL (ref 0.1–1)
MONOCYTES NFR BLD AUTO: 6.8 %
NEUTROPHILS # BLD AUTO: 6.97 X10*3/UL (ref 1.2–7.7)
NEUTROPHILS NFR BLD AUTO: 67.7 %
NRBC BLD-RTO: 0 /100 WBCS (ref 0–0)
PLATELET # BLD AUTO: 280 X10*3/UL (ref 150–450)
POTASSIUM SERPL-SCNC: 4.2 MMOL/L (ref 3.5–5.3)
PROT SERPL-MCNC: 8 G/DL (ref 6.4–8.2)
RBC # BLD AUTO: 4.88 X10*6/UL (ref 4–5.2)
SODIUM SERPL-SCNC: 138 MMOL/L (ref 136–145)
TIBC SERPL-MCNC: 337 UG/DL (ref 240–445)
UIBC SERPL-MCNC: 201 UG/DL (ref 110–370)
WBC # BLD AUTO: 10.3 X10*3/UL (ref 4.4–11.3)

## 2024-05-09 PROCEDURE — 83550 IRON BINDING TEST: CPT

## 2024-05-09 PROCEDURE — 36415 COLL VENOUS BLD VENIPUNCTURE: CPT

## 2024-05-09 PROCEDURE — 80053 COMPREHEN METABOLIC PANEL: CPT

## 2024-05-09 PROCEDURE — 83540 ASSAY OF IRON: CPT

## 2024-05-09 PROCEDURE — 85025 COMPLETE CBC W/AUTO DIFF WBC: CPT

## 2024-05-09 NOTE — ED PROVIDER NOTES
HPI   Chief Complaint   Patient presents with    Arm Injury     Pt had IV for outpatient CT at 5:30 pm today and states that the site is swollen after the IV being moved for not working well.        29-year-old female presenting to the ER today with some pain and swelling near her right elbow where she had an IV placed at this evening.  Patient states that she had a CT scan performed and they had placed an IV.  She states that this IV was painful when they placed it, the nurse used to saline flushes on it but was not functioning appropriately so they remove the IV.  Her CT scan was then done with an IV that was placed in the left arm.  Patient states the area has felt swollen and painful ever since the IV was taken out and the saline was flushed through it around 5 PM this evening.  She has not taken any medicine for relief of pain.  No further complaints.      History provided by:  Patient                      [place] Coma Scale Score: 15                     Patient History   Past Medical History:   Diagnosis Date    Acute vaginitis 06/08/2020    Acute vaginitis    Encounter for supervision of normal pregnancy, unspecified, unspecified trimester ([facility]) 08/09/2019    Encounter for supervision of normal pregnancy    Encounter for supervision of normal pregnancy, unspecified, unspecified trimester ([facility]) 10/22/2019    Prenatal care    Other complications of the puerperium, not elsewhere classified ([facility]) 06/16/2020    Postpartum headache    Other conditions influencing health status 07/23/2019    History of pregnancy    Other viral diseases complicating pregnancy, unspecified trimester ([facility]) 08/09/2019    Herpes simplex infection in pregnancy    Personal history of diseases of the blood and blood-forming organs and certain disorders involving the immune mechanism 11/11/2019    History of anemia    Personal history of diseases of the skin and subcutaneous tissue 08/09/2019    History of contact dermatitis     Personal history of other diseases of the female genital tract 07/23/2019    History of vaginal discharge    Personal history of other infectious and parasitic diseases 05/15/2020    History of herpes simplex infection    Personal history of other specified conditions 05/04/2020    History of headache    Personal history of other specified conditions 11/18/2019    History of fatigue    Personal history of other specified conditions 12/03/2019    History of weight gain    Sacrococcygeal disorders, not elsewhere classified 11/12/2020    Coccydynia    Secondary amenorrhea 11/11/2019    Secondary amenorrhea    Unspecified infection of urinary tract in pregnancy, unspecified trimester ([facility]) 10/05/2020    UTI in pregnancy    Unspecified tubal pregnancy without intrauterine pregnancy ([facility]) 10/05/2020    Ectopic pregnancy, tubal     Past Surgical History:   Procedure Laterality Date    OTHER SURGICAL HISTORY  02/09/2021    Ectopic pregnancy removal     Family History   Problem Relation Name Age of Onset    Chiari malformation Mother      Hypothyroidism Mother       Social History     Tobacco Use    Smoking status: Never    Smokeless tobacco: Never   Substance Use Topics    Alcohol use: Defer    Drug use: Defer       Physical Exam   ED Triage Vitals [05/08/24 2216]   Temperature Heart Rate Respirations BP   36.9 °C (98.4 °F) (!) 108 17 115/86      Pulse Ox Temp Source Heart Rate Source Patient Position   100 % Temporal Monitor Sitting      BP Location FiO2 (%)     Right arm --       Physical Exam  Constitutional:       General: She is not in acute distress.  Eyes:      Conjunctiva/sclera: Conjunctivae normal.   Cardiovascular:      Comments: Radial pulse 2+, cap refill less than 2 seconds.  Musculoskeletal:      Comments: Normal gait and strength tone, FROM RUE without bony tenderness or bony deformity. Mild swelling, tenderness in the right AC and just proximal. No palpable cord, no erythema or crepitus. No  further tenderness to RUE.   Skin:     General: Skin is warm.      Capillary Refill: Capillary refill takes less than 2 seconds.   Neurological:      Mental Status: She is alert and oriented to person, place, and time.         ED Course & MDM   Diagnoses as of 05/08/24 2227   Right arm pain       Medical Decision Making  29-year-old female presenting to the ER today with pain to her right arm after she had an IV placed this afternoon for a CT scan.  She states when the IV was initially placed it was painful, the nurse tried to put saline flushes through it but this only made her pain worse and they noted some swelling right away.  They told her they would remove the IV and then they placed an IV in the left arm.  She is not having any pain or issues with the IV that was placed to the left arm but her right arm still feels swollen and painful.  She has not had any redness to the area and denies numbness or paresthesias.  No further complaints and the patient arrives afebrile, mildly tachycardic with otherwise stable vital signs.  On exam she is good distal pulses, cap refill less than 2 seconds there is no bony tenderness or bony deformity to the right upper extremity.  Range of motion is intact.  She is tender with mild swelling in the right AC and just proximal to this region but there is no palpable cord and no crepitus or erythema or signs of infection.  Patient did have a CT PE scan earlier today which did not show any evidence of acute abnormality.  I discussed with the patient this is most likely due to IV infiltration and supportive measures to take at home as well as instructing her to take ibuprofen every 8 hours for the next 4 to 5 days to help with the inflammation.  I discussed that she should monitor this closely and follow-up for a check of this area by her PCP in the next 2 to 3 days and I outlined warning signs to return to the emergency department and she expressed understanding and agreed with the  plan of care today.        Procedure  Procedures     Brooke Portillo PA-C  05/08/24 3089

## 2024-05-10 DIAGNOSIS — R00.2 PALPITATIONS: Primary | ICD-10-CM

## 2024-05-16 ENCOUNTER — OFFICE VISIT (OUTPATIENT)
Dept: SURGERY | Facility: CLINIC | Age: 29
End: 2024-05-16
Payer: COMMERCIAL

## 2024-05-16 VITALS
TEMPERATURE: 98.3 F | OXYGEN SATURATION: 100 % | SYSTOLIC BLOOD PRESSURE: 126 MMHG | HEART RATE: 94 BPM | DIASTOLIC BLOOD PRESSURE: 85 MMHG | RESPIRATION RATE: 16 BRPM

## 2024-05-16 DIAGNOSIS — K80.20 CALCULUS OF GALLBLADDER WITHOUT CHOLECYSTITIS WITHOUT OBSTRUCTION: Primary | ICD-10-CM

## 2024-05-16 PROCEDURE — 99024 POSTOP FOLLOW-UP VISIT: CPT | Performed by: SURGERY

## 2024-05-16 NOTE — PROGRESS NOTES
Follow-Up Note        Referring Provider: Dr. Ines Munoz        Chief Complaint:  Follow up from cholecystectomy        History of Present Illness:  This is a 29-year-old female who presents for follow-up after laparoscopic cholecystectomy and intraoperative cholangiogram on 5/1/2024.  Since surgery she was seen by her PCP on 5/8/2024 and also presented to the ED for tachycardia.  Patient had a CT PE protocol, which was found to be normal.     Past Medical History:  Anxiety/depression, migraines, bipolar disorder, nephrolithiasis, myalgia of the pelvic floor, cholelithiasis     Past Surgical History:  Tonsil removal, excision of fallopian tube for ectopic pregnancy, laparoscopic cholecystectomy with intraoperative cholangiogram 5/1/2024        Medications:  As per medical record        Allergies:  Aripiprazole        Family History:  The information was reviewed and no pertinent findings are relevant to the presenting problem.        Social History:  Patient lives at home with her  and 3 children ages 2, 4, and 8.  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  She denies smoking, occasionally drinks EtOH, denies IDU.        Review of Systems  A complete 10 point review of systems was performed and is negative except as noted in the history of present illness.    Physical Exam:   /85 (BP Location: Right arm, Patient Position: Sitting, BP Cuff Size: Adult)   Pulse 94   Temp 36.8 °C (98.3 °F) (Temporal)   Resp 16   LMP 04/25/2024 (Approximate)   SpO2 100%     General: No acute distress. Sitting up in chair.  Neuro: Alert and oriented ×3. Follows commands.  Head: Atraumatic  Eyes: Pupils equal reactive to light. Extraocular motions intact.  Ears: Hears normal speaking voice.  Mouth, Nose, Throat: Mucous membranes moist.  Normal dentition.  Neck: Supple. No appreciable masses.  Chest: No crepitus.    Heart: Regular rate and rhythm.  Lung: Clear to auscultation bilaterally.  Vascular: No  carotid bruits.  Palpable radial pulses bilaterally.  Abdomen: Soft. Nondistended. Nontender.  Well-healed laparoscopic port sites.  Musculoskeletal: Moves all extremities.  Normal range of motion.  Lymphatic: No palpable lymph nodes.  Skin: No rashes or lesions.  Psychological: Normal affect     Pathology: GALLBLADDER, EXCISION:              - MILD CHRONIC CHOLECYSTITIS ASSOCIATED WITH CHOLELITHIASIS.              - GALLSTONE IMPACTED IN CYSTIC DUCT AREA.      Assessment:  This is a 29-year-old female who presents for follow up of a laparoscopic cholecystectomy with intraoperative cholangiogram for symptomatic cholelithiasis.  She did present to her PCP on 5/8/2024 with feelings of increased anxiety, tachycardia and was sent for a CT with PE protocol.  That was negative but she then presented to the ED later that day for IV site infiltration.  Since surgery she has had no abdominal complaints and has been healing well.  There is no evidence of any obvious complications.        Plan:  -- OK to swim in 2weeks.  -- No lifting anything heavier than 20 pounds for another 4 weeks, which is 6 weeks postoperative.  -- At this point, there is no specific need to follow up with me.  If she has any new questions or concerns, she may return at any time.        Meagan Harrison MD MPH  General Surgery  Office: [phone]  Fax: [phone]

## 2024-10-21 DIAGNOSIS — R12 HEARTBURN: ICD-10-CM

## 2024-10-21 DIAGNOSIS — K21.9 GASTROESOPHAGEAL REFLUX DISEASE WITHOUT ESOPHAGITIS: ICD-10-CM

## 2024-10-21 RX ORDER — PANTOPRAZOLE SODIUM 40 MG/1
40 TABLET, DELAYED RELEASE ORAL
Qty: 90 TABLET | Refills: 1 | Status: SHIPPED | OUTPATIENT
Start: 2024-10-21 | End: 2025-10-21

## 2025-04-18 DIAGNOSIS — K21.9 GASTROESOPHAGEAL REFLUX DISEASE WITHOUT ESOPHAGITIS: ICD-10-CM

## 2025-04-18 DIAGNOSIS — R12 HEARTBURN: ICD-10-CM

## 2025-04-20 RX ORDER — PANTOPRAZOLE SODIUM 40 MG/1
40 TABLET, DELAYED RELEASE ORAL
Qty: 90 TABLET | Refills: 0 | OUTPATIENT
Start: 2025-04-20

## (undated) DEVICE — NEEDLE, INSUFFLATION, 13GAX120MM, DISP

## (undated) DEVICE — SOLUTION, IRRIGATION, STERILE WATER, 1000 ML, POUR BOTTLE

## (undated) DEVICE — TROCAR SYSTEM, BALLOON, KII GELPORT, 12 X 100MM

## (undated) DEVICE — SUTURE, VICRYL, 0, 27 IN, UR-6, VIOLET

## (undated) DEVICE — SCISSORS, METZ, CURVED, 3/4 BLADE

## (undated) DEVICE — SUTURE, MONOCRYL, 4-0, 27 IN, PS-2, UNDYED

## (undated) DEVICE — CATHETER, LAPAROSCOPIC, CHOLANGIOGRAPHY

## (undated) DEVICE — APPLICATOR, CHLORAPREP, W/ORANGE TINT, 26ML

## (undated) DEVICE — SOLUTION, IRRIGATION, SODIUM CHLORIDE 0.9%, 1000 ML, POUR BOTTLE

## (undated) DEVICE — TISSUE ADHESIVE, PREMIERPRO EXOFIN, PRECISION PEN HV, 1.0ML

## (undated) DEVICE — DRAPE, C-ARM IMAGE

## (undated) DEVICE — TROCAR, KII OPTICAL BLADELESS 5MM Z THREAD 100MM LNGTH

## (undated) DEVICE — GRASPER, LAPAROSCOPIC, DIRECT DRIVE, 5MM X35CM, DISP

## (undated) DEVICE — TOWEL PACK, STERILE, 4/PACK, BLUE

## (undated) DEVICE — TROCAR, OPTICAL, BLADELESS, 12MM, THREADED, 100MM LENGTH

## (undated) DEVICE — APPLIER,  LIGACLIP, ENDO ROTATE, ROTATING, 10MM 20M/L, DISP

## (undated) DEVICE — ASSEMBLY, STRYKER FLOW 2, SUCTION IRRIGATOR, WITH TIP

## (undated) DEVICE — RETRIEVAL SYSTEM, MONARCH, 10MM DISP ENDOSCOPIC

## (undated) DEVICE — GLOVE, SURGICAL, BIOGEL, 6, PF, LATEX, GREEN

## (undated) DEVICE — Device